# Patient Record
Sex: FEMALE | Race: WHITE | NOT HISPANIC OR LATINO | Employment: OTHER | URBAN - METROPOLITAN AREA
[De-identification: names, ages, dates, MRNs, and addresses within clinical notes are randomized per-mention and may not be internally consistent; named-entity substitution may affect disease eponyms.]

---

## 2021-06-14 ENCOUNTER — MEDICAL CORRESPONDENCE (OUTPATIENT)
Dept: HEALTH INFORMATION MANAGEMENT | Facility: CLINIC | Age: 31
End: 2021-06-14

## 2021-06-15 ENCOUNTER — TRANSCRIBE ORDERS (OUTPATIENT)
Dept: OTHER | Age: 31
End: 2021-06-15

## 2021-06-15 DIAGNOSIS — Z80.3 FAMILY HISTORY OF MALIGNANT NEOPLASM OF BREAST: Primary | ICD-10-CM

## 2021-06-27 ENCOUNTER — HEALTH MAINTENANCE LETTER (OUTPATIENT)
Age: 31
End: 2021-06-27

## 2021-07-28 ENCOUNTER — VIRTUAL VISIT (OUTPATIENT)
Dept: ONCOLOGY | Facility: CLINIC | Age: 31
End: 2021-07-28
Attending: OBSTETRICS & GYNECOLOGY
Payer: COMMERCIAL

## 2021-07-28 DIAGNOSIS — Z80.42 FAMILY HISTORY OF PROSTATE CANCER: ICD-10-CM

## 2021-07-28 DIAGNOSIS — Z80.3 FAMILY HISTORY OF MALIGNANT NEOPLASM OF BREAST: ICD-10-CM

## 2021-07-28 DIAGNOSIS — Z84.81 FAMILY HISTORY OF GENE MUTATION: Primary | ICD-10-CM

## 2021-07-28 DIAGNOSIS — Z80.0 FAMILY HISTORY OF COLON CANCER: ICD-10-CM

## 2021-07-28 PROCEDURE — 96040 HC GENETIC COUNSELING, EACH 30 MINUTES: CPT | Mod: GT | Performed by: GENETIC COUNSELOR, MS

## 2021-07-28 NOTE — LETTER
Cancer Risk Management  Program Locations    [facility]  [facility]  [facility]  Mailing Address  Cancer Risk Management Program  [address]  [place] [address]    New patient appointments  [phone]  July 29, 2021  Sujatha Astorga  [address] [place] [address]    Dear Sujatha,    It was a pleasure speaking with you over video for genetic counseling on 7/28/2021. Here is a copy of the progress note from our discussion. If you have any additional questions, please feel free to call.    Presenting Information:   I spoke with Sujatha Astorga over video today for genetic counseling to discuss her family history of cancer and a known BRCA2 mutation in her mother. With her permission, this appointment was conducted virtually due to COVID-19 precautions. We talked today to review this history, cancer screening recommendations, and available genetic testing options.    Personal History:  Sujatha is a 31 year old female. She does not have any personal history of cancer.    She had her first menstrual period at age 18 and is premenopausal. She does not have any children. Sujatha has her ovaries, fallopian tubes and uterus in place. She reports that she has not used hormone replacement therapy. She has used oral contraceptives and currently has an IUD. She has not yet had any breast imaging due to her age. She had a colonoscopy on 1/4/21 due to rectal bleeding, abdominal pain that detected no polyps. She reports one skin biopsy in 2012 that was benign. Sujatha reported no history of tobacco use and alcohol use of 6 drinks per week.    Family History: (Please see scanned pedigree for detailed family history information)  Siblings:    She has two sisters (ages 31 and 18) and one brother (age 25), all with no known  history of cancer. Her 31 year old sister has had genetic testing and was negative for the familial mutation. Her other sister is in the process of having genetic testing as well. She recently found a lump in her breast that is currently being evaluated.   Maternal:    Her mother is 55 years old and was recently diagnosed with breast cancer at age 55. She is currently having chemotherapy and is planning for a bilateral mastectomy. She also has a history of skin cancer in her late 40s-50. She recently underwent genetic testing and was found to have a mutation in the BRCA2 gene. Her testing was performed at [facility] in May 2021 via the Breast Cancer STAT panel plus EMILIO and CHEK2. She was positive for a mutation in the BRCA2 gene called c.2957dup (also known as p.[id number]).    Her aunt is 52 years old with no known history of cancer. She has a history of thyroid nodules at age 52. She has not yet had any genetic testing.     Her grandmother is 76 years old and was diagnosed with breast cancer at age 60. Treatment included chemotherapy. She has not yet had any genetic testing.     Her brother (Delmys great-uncle) had lung and brain cancer.     Her grandfather is 78 years old and has a history of skin cancer at age 50. He has not had any genetic testing.     He had two brothers who had prostate cancer, and a brother who had lung cancer.   Paternal:    Her father is 59 years old and has a history of skin cancer at age 55     Her uncle is 63 years old and has a history of colon cancer at an unknown age, possibly 10 years ago.    Her grandmother is in her 70s with no known history of cancer.     Her mother (Iqra great-grandmother) had either a gynecologic or breast cancer.     Her father had prostate cancer.     Her grandfather is in his 70s with no known history of cancer.     His mother had bone cancer.     Her maternal ethnicity is Citizen of [place]. Her paternal ethnicity is Citizen of [place]. There is no known Ashkenazi  Hoahaoism ancestry on either side of her family.     Discussion:    We reviewed the features of sporadic, familial, and hereditary cancers. There is a known BRCA2 mutation that has been detected in her mother. We reviewed that Sujatha has a 50% chance to have inherited this same mutation.     We discussed the natural history and genetics of hereditary cancer. We discussed the BRCA2 gene. Mutations in this gene cause a condition known as Hereditary Breast and Ovarian Cancer (HBOC) syndrome. Women with a mutation in this gene are at increased risk for breast and ovarian cancer. There is also an increased risk for a second primary breast cancer. Men with a mutation are at increased risk for breast and prostate cancer. Both women and men may also be at increased risk for pancreatic cancer and melanoma. A detailed handout regarding the BRCA2 gene and other genes in which mutations are associated with an increased risk for breast cancer will be provided to Sujatha via [facility] and can be found in the after visit summary. Topics included: inheritance pattern, cancer risks, cancer screening recommendations, and also risks, benefits and limitations of testing.    Based on her personal and family history, Sujatha meets current National Comprehensive Cancer Network (NCCN) criteria for genetic testing of high-penetrance breast and/or ovarian cancer susceptibility genes, which often includes BRCA1, BRCA2, CDH1, PALB2, PTEN, and TP53 among others.      We discussed that there are additional genes that could cause increased risk for breast, colon, prostate, and gynecologic cancer. As many of these genes present with overlapping features in a family and accurate cancer risk cannot always be established based upon the pedigree analysis alone, it would be reasonable for Sujatha to consider panel genetic testing to analyze multiple genes at once.    We reviewed genetic testing options for Sujatha based on her personal and family history:  testing for the familial BRCA2 mutation only, testing of a panel of genes associated with an increased risk for certain cancers, or larger panel options to include genes associated with increased risk for multiple different cancer types. Sujatha expressed an interest in more broad testing and opted for the [facility] Common Hereditary Cancers Panel.   Genetic testing is available for 47 genes associated with cancers of the breast, ovary, uterus, prostate and gastrointestinal system: [facility] Common Hereditary Cancers panel (APC, EMILIO, AXIN2, BARD1, BMPR1A, BRCA1, BRCA2, BRIP1, CDH1, CDK4, CDKN2A, CHEK2, CTNNA1, DICER1, EPCAM, GREM1, HOXB13, KIT, MEN1, MLH1, MSH2, MSH3, MSH6, MUTYH, NBN, NF1, NTHL1, PALB2, PDGFRA, PMS2, POLD1, POLE, PTEN,RAD50, RAD51C, RAD51D, SDHA, SDHB, SDHC, SDHD, SMAD4, SMARCA4, STK11, TP53,TSC1, TSC2, VHL).    We discussed that many of these genes are associated with specific hereditary cancer syndromes and published management guidelines: Hereditary Breast and Ovarian Cancer syndrome (BRCA1, BRCA2), Michael syndrome (MLH1, MSH2, MSH6, PMS2, EPCAM), Familial Adenomatous Polyposis (APC), Hereditary Diffuse Gastric Cancer (CDH1), Familial Atypical Multiple Mole Melanoma syndrome (CDK4, CDKN2A), Multiple Endocrine Neoplasia type 1 (MEN1), Juvenile Polyposis syndrome (BMPR1A, SMAD4), Cowden syndrome (PTEN), Li Fraumeni syndrome (TP53), Hereditary Paraganglioma and Pheochromocytoma syndrome (SDHA, SDHB, SDHC, SDHD), Peutz-Jeghers syndrome (STK11), MUTYH Associated Polyposis (MUTYH), Tuberous sclerosis complex (TSC1, TSC2), Von Hippel-Lindau disease (VHL), and Neurofibromatosis type 1 (NF1).   The EMILIO, AXIN2, BRIP1, CHEK2, GREM1, MSH3, NBN, NTHL1, PALB2, POLD1, POLE, RAD51C, and RAD51D genes are associated with increased cancer risk and have published management guidelines for certain cancers.   The remaining genes (BARD1, CTNNA1, DICER1, HOXB13, KIT, PDGFRA, RAD50, and SMARCA4) are associated with increased  cancer risk and may allow us to make medical recommendations when mutations are identified.     Due to COVID-19 precautions consent was obtained over the phone/video today. Genetic testing via the [facility] Common Hereditary Cancers Panel will be sent to [facility]. Sujatha opted to have a saliva sample collection kit shipped directly to her home from [facility]. She will ship the kit back to [facility] for analysis. Turnaround time from date when sample is received at the lab: approximately 3-4 weeks.    Medical Management: For Sujatha, we reviewed that the information from genetic testing may determine:    additional cancer screening for which Sujatha may qualify (i.e. mammogram and breast MRI, more frequent colonoscopies, more frequent dermatologic exams, etc.),    options for risk reducing surgeries Sujatha could consider (i.e. bilateral mastectomy, surgery to remove her ovaries and/or uterus, etc.),      and targeted chemotherapies if she were to develop certain cancers in the future (i.e. immunotherapy for individuals with Michael syndrome, PARP inhibitors, etc.).     These recommendations will be discussed in detail once genetic testing is completed.     Plan:  1) Today Sujatha elected to proceed with genetic testing via the [facility] Common Hereditary Cancers Panel offered by [facility].  2) This information should be available in 4-5 weeks.  3) Sujatha will be scheduled for a virtual visit (phone or video) to discuss the results.    Antionette Bledsoe MS, [facility]  Licensed, Certified Genetic Counselor  Office: [phone], Email: [email or web address]

## 2021-07-28 NOTE — PROGRESS NOTES
7/28/2021    Sujatha is a 31 year old who is being evaluated via a billable video visit.      Video-Visit Details    Type of service: Video Visit    Video Start Time: 09:05 am  Video End Time: 09:58 am    Originating Location (pt. Location): Home    Distant Location (provider location): Cancer Risk Management Program    Platform used for Video Visit: [employer]    Referring Provider: Angelia Flores MD    Presenting Information:   I spoke with Sujatha Astorga over video today for genetic counseling to discuss her family history of cancer and a known BRCA2 mutation in her mother. With her permission, this appointment was conducted virtually due to COVID-19 precautions. We talked today to review this history, cancer screening recommendations, and available genetic testing options.    Personal History:  Sujatha is a 31 year old female. She does not have any personal history of cancer.    She had her first menstrual period at age 18 and is premenopausal. She does not have any children. Sujatha has her ovaries, fallopian tubes and uterus in place. She reports that she has not used hormone replacement therapy. She has used oral contraceptives and currently has an IUD. She has not yet had any breast imaging due to her age. She had a colonoscopy on 1/4/21 due to rectal bleeding, abdominal pain that detected no polyps. She reports one skin biopsy in 2012 that was benign. Sujatha reported no history of tobacco use and alcohol use of 6 drinks per week.    Family History: (Please see scanned pedigree for detailed family history information)  Siblings:    She has two sisters (ages 31 and 18) and one brother (age 25), all with no known history of cancer. Her 31 year old sister has had genetic testing and was negative for the familial mutation. Her other sister is in the process of having genetic testing as well. She recently found a lump in her breast that is currently being evaluated.   Maternal:    Her mother is 55 years old and  was recently diagnosed with breast cancer at age 55. She is currently having chemotherapy and is planning for a bilateral mastectomy. She also has a history of skin cancer in her late 40s-50. She recently underwent genetic testing and was found to have a mutation in the BRCA2 gene. Her testing was performed at [facility] in May 2021 via the Breast Cancer STAT panel plus EMILIO and CHEK2. She was positive for a mutation in the BRCA2 gene called c.2957dup (also known as p.[id number]*2).    Her aunt is 52 years old with no known history of cancer. She has a history of thyroid nodules at age 52. She has not yet had any genetic testing.     Her grandmother is 76 years old and was diagnosed with breast cancer at age 60. Treatment included chemotherapy. She has not yet had any genetic testing.     Her brother (Sujatha's great-uncle) had lung and brain cancer.     Her grandfather is 78 years old and has a history of skin cancer at age 50. He has not had any genetic testing.     He had two brothers who had prostate cancer, and a brother who had lung cancer.   Paternal:    Her father is 59 years old and has a history of skin cancer at age 55     Her uncle is 63 years old and has a history of colon cancer at an unknown age, possibly 10 years ago.    Her grandmother is in her 70s with no known history of cancer.     Her mother (Sujatha's great-grandmother) had either a gynecologic or breast cancer.     Her father had prostate cancer.     Her grandfather is in his 70s with no known history of cancer.     His mother had bone cancer.     Her maternal ethnicity is [place]. Her paternal ethnicity is Syrian. There is no known Ashkenazi Mosque ancestry on either side of her family.     Discussion:    We reviewed the features of sporadic, familial, and hereditary cancers. There is a known BRCA2 mutation that has been detected in her mother. We reviewed that Sujatha has a 50% chance to have inherited this same mutation.     We  discussed the natural history and genetics of hereditary cancer. We discussed the BRCA2 gene. Mutations in this gene cause a condition known as Hereditary Breast and Ovarian Cancer (HBOC) syndrome. Women with a mutation in this gene are at increased risk for breast and ovarian cancer. There is also an increased risk for a second primary breast cancer. Men with a mutation are at increased risk for breast and prostate cancer. Both women and men may also be at increased risk for pancreatic cancer and melanoma. A detailed handout regarding the BRCA2 gene and other genes in which mutations are associated with an increased risk for breast cancer will be provided to Sujatha via [facility] and can be found in the after visit summary. Topics included: inheritance pattern, cancer risks, cancer screening recommendations, and also risks, benefits and limitations of testing.    Based on her personal and family history, Sujatha meets current National Comprehensive Cancer Network (NCCN) criteria for genetic testing of high-penetrance breast and/or ovarian cancer susceptibility genes, which often includes BRCA1, BRCA2, CDH1, PALB2, PTEN, and TP53 among others.      We discussed that there are additional genes that could cause increased risk for breast, colon, prostate, and gynecologic cancer. As many of these genes present with overlapping features in a family and accurate cancer risk cannot always be established based upon the pedigree analysis alone, it would be reasonable for Sujatha to consider panel genetic testing to analyze multiple genes at once.    We reviewed genetic testing options for Sujatha based on her personal and family history: testing for the familial BRCA2 mutation only, testing of a panel of genes associated with an increased risk for certain cancers, or larger panel options to include genes associated with increased risk for multiple different cancer types. Sujatha expressed an interest in more broad testing and  opted for the [facility] Common Hereditary Cancers Panel.   Genetic testing is available for 47 genes associated with cancers of the breast, ovary, uterus, prostate and gastrointestinal system: [facility] Common Hereditary Cancers panel (APC, EMILIO, AXIN2, BARD1, BMPR1A, BRCA1, BRCA2, BRIP1, CDH1, CDK4, CDKN2A, CHEK2, CTNNA1, DICER1, EPCAM, GREM1, HOXB13, KIT, MEN1, MLH1, MSH2, MSH3, MSH6, MUTYH, NBN, NF1, NTHL1, PALB2, PDGFRA, PMS2, POLD1, POLE, PTEN,RAD50, RAD51C, RAD51D, SDHA, SDHB, SDHC, SDHD, SMAD4, SMARCA4, STK11, TP53,TSC1, TSC2, VHL).    We discussed that many of these genes are associated with specific hereditary cancer syndromes and published management guidelines: Hereditary Breast and Ovarian Cancer syndrome (BRCA1, BRCA2), Michael syndrome (MLH1, MSH2, MSH6, PMS2, EPCAM), Familial Adenomatous Polyposis (APC), Hereditary Diffuse Gastric Cancer (CDH1), Familial Atypical Multiple Mole Melanoma syndrome (CDK4, CDKN2A), Multiple Endocrine Neoplasia type 1 (MEN1), Juvenile Polyposis syndrome (BMPR1A, SMAD4), Cowden syndrome (PTEN), Li Fraumeni syndrome (TP53), Hereditary Paraganglioma and Pheochromocytoma syndrome (SDHA, SDHB, SDHC, SDHD), Peutz-Jeghers syndrome (STK11), MUTYH Associated Polyposis (MUTYH), Tuberous sclerosis complex (TSC1, TSC2), Von Hippel-Lindau disease (VHL), and Neurofibromatosis type 1 (NF1).   The EMILIO, AXIN2, BRIP1, CHEK2, GREM1, MSH3, NBN, NTHL1, PALB2, POLD1, POLE, RAD51C, and RAD51D genes are associated with increased cancer risk and have published management guidelines for certain cancers.   The remaining genes (BARD1, CTNNA1, DICER1, HOXB13, KIT, PDGFRA, RAD50, and SMARCA4) are associated with increased cancer risk and may allow us to make medical recommendations when mutations are identified.     Due to COVID-19 precautions consent was obtained over the phone/video today. Genetic testing via the [facility] Common Hereditary Cancers Panel will be sent to [facility]. Sujatha  opted to have a saliva sample collection kit shipped directly to her home from [facility]. She will ship the kit back to [email or web address] for analysis. Turnaround time from date when sample is received at the lab: approximately 3-4 weeks.    Medical Management: For Sujatha, we reviewed that the information from genetic testing may determine:    additional cancer screening for which Sujatha may qualify (i.e. mammogram and breast MRI, more frequent colonoscopies, more frequent dermatologic exams, etc.),    options for risk reducing surgeries Sujatha could consider (i.e. bilateral mastectomy, surgery to remove her ovaries and/or uterus, etc.),      and targeted chemotherapies if she were to develop certain cancers in the future (i.e. immunotherapy for individuals with Michael syndrome, PARP inhibitors, etc.).     These recommendations will be discussed in detail once genetic testing is completed.     Plan:  1) Today Sujatha elected to proceed with genetic testing via the [facility] Common Hereditary Cancers Panel offered by [facility].  2) This information should be available in 4-5 weeks.  3) Sujatha will be scheduled for a virtual visit (phone or video) to discuss the results.    Time spent over video: 53 minutes    Antionette Bledsoe MS, [facility]  Licensed, Certified Genetic Counselor  Office: [phone]  Email: [email or web address]

## 2021-07-29 NOTE — PATIENT INSTRUCTIONS
Assessing Cancer Risk  Only about 5-10% of cancers are thought to be due to an inherited cancer susceptibility gene.    These families often have:    Several people with the same or related types of cancer    Cancers diagnosed at a young age (before age 50)    Individuals with more than one primary cancer    Multiple generations of the family affected with cancer    Some people may be candidates for genetic testing of more than one gene.  For these families, genetic testing using a cancer panel may be offered.  These panels will test different genes known to increase the risk for breast, ovarian, uterine, and/or other cancers. All of the genes discussed below have published clinical management guidelines for individuals who are found to carry a mutation. The purpose of this handout is to serve as a brief summary of the genes analyzed by the panels used to inquire about hereditary breast and gynecologic cancer:  EMILIO, BRCA1, BRCA2, BRIP1, CDH1, CHEK2, MLH1, MSH2, MSH6, PMS2, EPCAM, PTEN, PALB2, RAD51C, RAD51D, and TP53.  ______________________________________________________________________________  Hereditary Breast and Ovarian Cancer Syndrome   (BRCA1 and BRCA2)  A single mutation in one of the copies of BRCA1 or BRCA2 increases the risk for breast and ovarian cancer, among others.  The risk for pancreatic cancer and melanoma may also be slightly increased in some families.  The chart below shows the chance that someone with a BRCA mutation would develop cancer in his or her lifetime1,2,3,4.        A person s ethnic background is also important to consider, as individuals of Ashkenazi Rastafari ancestry have a higher chance of having a BRCA gene mutation.  There are three BRCA mutations that occur more frequently in this population.    Michael Syndrome   (MLH1, MSH2, MSH6, PMS2, and EPCAM)  Currently five genes are known to cause Michael Syndrome: MLH1, MSH2, MSH6, PMS2, and EPCAM.  A single mutation in one of the  Michael Syndrome genes increases the risk for colon, endometrial, ovarian, and stomach cancers.  Other cancers that occur less commonly in Michael Syndrome include urinary tract, skin, and brain cancers.  The chart below shows the chance that a person with Michael syndrome would develop cancer in his or her lifetime5.      *Cancer risk varies depending on Michael syndrome gene found    Cowden Syndrome   (PTEN)  Cowden syndrome is a hereditary condition that increases the risk for breast, thyroid, endometrial, colon, and kidney cancer.  Cowden syndrome is caused by a mutation in the PTEN gene.  A single mutation in one of the copies of PTEN causes Cowden syndrome and increases cancer risk.  The chart below shows the chance that someone with a PTEN mutation would develop cancer in their lifetime6,7.  Other benign features seen in some individuals with Cowden syndrome include benign skin lesions (facial papules, keratoses, lipomas), learning disability, autism, thyroid nodules, colon polyps, and larger head size.      *One recent study found breast cancer risk to be increased to 85%    Li-Fraumeni Syndrome   (TP53)  Li-Fraumeni Syndrome (LFS) is a cancer predisposition syndrome caused by a mutation in the TP53 gene. A single mutation in one of the copies of TP53 increases the risk for multiple cancers. Individuals with LFS are at an increased risk for developing cancer at a young age. The lifetime risk for development of a LFS-associated cancer is 50% by age 30 and 90% by age 60.   Core Cancers: Sarcomas, Breast, Brain, Lung, Leukemias/Lymphomas, Adrenocortical carcinomas  Other Cancers: Gastrointestinal, Thyroid, Skin, Genitourinary    Hereditary Diffuse Gastric Cancer   (CDH1)  Currently, one gene is known to cause hereditary diffuse gastric cancer (HDGC): CDH1.  Individuals with HDGC are at increased risk for diffuse gastric cancer and lobular breast cancer. Of people diagnosed with HDGC, 30-50% have a mutation in the CDH1  gene.  This suggests there are likely other genes that may cause HDGC that have not been identified yet.      Lifetime Cancer Risks    General Population HDGC    Diffuse Gastric  <1% ~80%   Breast 12% 39-52%         Additional Genes  EMILIO  EMILIO is a moderate-risk breast cancer gene. Women who have a mutation in EMILOI can have between a 2-4 fold increased risk for breast cancer compared to the general population8. EMILIO mutations have also been associated with increased risk for pancreatic cancer, however an estimate of this cancer risk is not well understood9. Individuals who inherit two EMILIO mutations have a condition called ataxia-telangiectasia (AT).  This rare autosomal recessive condition affects the nervous system and immune system, and is associated with progressive cerebellar ataxia beginning in childhood.  Individuals with ataxia-telangiectasia often have a weakened immune system and have an increased risk for childhood cancers.    PALB2  Mutations in PALB2 have been shown to increase the risk of breast cancer up to 33-58% in some families; where individuals fall within this risk range is dependent upon family [id number]. PALB2 mutations have also been associated with increased risk for pancreatic cancer, although this risk has not been quantified yet.  Individuals who inherit two PALB2 mutations--one from their mother and one from their father--have a condition called Fanconi Anemia.  This rare autosomal recessive condition is associated with short stature, developmental delay, bone marrow failure, and increased risk for childhood cancers.    CHEK2   CHEK2 is a moderate-risk breast cancer gene.  Women who have a mutation in CHEK2 have around a 2-fold increased risk for breast cancer compared to the general population, and this risk may be higher depending upon family history.11,12,13 Mutations in CHEK2 have also been shown to increase the risk of a number of other cancers, including colon and prostate, however  these cancer risks are currently not well understood.    BRIP1, RAD51C and RAD51D  Mutations in BRIP1, RAD51C, and RAD51D have been shown to increase the risk of ovarian cancer and possibly female breast cancer as well14,15 .       Lifetime Cancer Risk    General Population BRIP1 RAD51C RAD51D   Ovarian 1-2% ~5-8% ~5-9% ~7-15%           Inheritance  All of the cancer syndromes reviewed above are inherited in an autosomal dominant pattern.  This means that if a parent has a mutation, each of his or her children will have a 50% chance of inheriting that same mutation.  Therefore, each child--male or female--would have a 50% chance of being at increased risk for developing cancer.      Image obtained from Genetics Home Reference, 2013     Mutations in some genes can occur de asad, which means that a person s mutation occurred for the first time in them and was not inherited from a parent.  Now that they have the mutation, however, it can be passed on to future generations.    Genetic Testing  Genetic testing involves a blood test and will look at the genetic information in the EMILIO, BRCA1, BRCA2, BRIP1, CDH1, CHEK2, MLH1, MSH2, MSH6, PMS2, EPCAM, PTEN, PALB2, RAD51C, RAD51D, and TP53 genes for any harmful mutations that are associated with increased cancer risk.  If possible, it is recommended that the person(s) who has had cancer be tested before other family members.  That person will give us the most useful information about whether or not a specific gene is associated with the cancer in the family.    Results  There are three possible results of genetic testing:    Positive--a harmful mutation was identified in one or more of the genes    Negative--no mutation was identified in any of the genes on this panel    Variant of unknown significance--a variation in one of the genes was identified, but it is unclear how this impacts cancer risk in the family    Advantages and Disadvantages   There are advantages and  disadvantages to genetic testing.    Advantages    May clarify your cancer risk    Can help you make medical decisions    May explain the cancers in your family    May give useful information to your family members (if you share your results)    Disadvantages    Possible negative emotional impact of learning about inherited cancer risk    Uncertainty in interpreting a negative test result in some situations    Possible genetic discrimination concerns (see below)    Genetic Information Nondiscrimination Act (JUANITO)  JUANITO is a federal law that protects individuals from health insurance or employment discrimination based on a genetic test result alone.  Although rare, there are currently no legal discrimination protections in terms of life insurance, long term care, or disability insurances.  Visit the [employer] [place] website to learn more.    Reducing Cancer Risk  All of the genes described above have nationally recognized cancer screening guidelines that would be recommended for individuals who test positive.  In addition to increased cancer screening, surgeries may be offered or recommended to reduce cancer risk.  Recommendations are based upon an individual s genetic test result as well as their personal and family history of cancer.    Questions to Think About Regarding Genetic Testing:    What effect will the test result have on me and my relationship with my family members if I have an inherited gene mutation?  If I don t have a gene mutation?    Should I share my test results, and how will my family react to this news, which may also affect them?    Are my children ready to learn new information that may one day affect their own health?    Hereditary Cancer Resources    FORCE: Facing Our Risk of Cancer Empowered [email or web address]   Bright Pink [email or web address]   Li-Fraumeni Syndrome Association [email or web address]   PTEN World PTENworld.com   No stomach for cancer, Inc.  [email or web address]   Stomach cancer relief network Scrnet.org   Collaborative Group of the Americas on Inherited Colorectal Cancer (CGA) cgaicc.com    Cancer Care [email or web address]   American Cancer Society (ACS) cancer.org   National Cancer [place] (NCI) cancer.gov     Please call us if you have any questions or concerns.   Cancer Risk Management Program [phone] ([phone])  ? Miki Sanchez, MS, [facility] [phone]  ? Ayla Hickman, MS, [facility]  [phone]  ? Florencia Koch, MS, [facility]  [phone]  ? Olga Kellogg, MS, [facility] [phone]  ? Seema Mary, MS, [facility] [phone]  ? Antionette Bledsoe, MS, [facility]  [phone]    References  1. Erin A, Carl PDP, Julissa S, Korin MUSTAFA, Mic JE, Daphnie JL, Christo N, Alysa H, Dang O, Tom A, Jayson B, Tyler P, Manrichy S, Bridgette DM, Noguera N, Balta E, Rachel H, Kulwinder E, Tim J, Gronjack J, Adriana B, Citlaly H, Thorlacius S, Eerola H, Bernabe H, Venkatesh K, Becca OP. Average risks of breast and ovarian cancer associated with BRCA1 or BRCA2 mutations detected in case series unselected for family history: a combined analysis of 222 studies. Am J Hum Sheree. 2003;72:1117-30.  2. Fidelia ADAMS, Evie M, Bartolo G.  BRCA1 and BRCA2 Hereditary Breast and Ovarian Cancer. Gene Reviews online. 2013.  3. Tom YC, Edmund S, Hadley G, Greene S. Breast cancer risk among male BRCA1 and BRCA2 mutation carriers. J Natl Cancer Inst. 2007;99:1811-4.  4. Colby FARRELL, Marilu I, Shabbir J, Jonny E, Pa ER, Akil F. Risk of breast cancer in male BRCA2 carriers. J Med Sheree. 2010;47:710-1.  5. National Comprehensive Cancer Network. Clinical practice guidelines in oncology, colorectal cancer screening. Available online (registration required). 2015.  6. Cornelio CRUZ, Geovanna J, Melany J, Cristiane LA, Dwain JOLLY, Reema C. Lifetime cancer risks in individuals with germline PTEN mutations. Clin Cancer Res. 2012;18:400-7.  7. Brenda COCHRAN. Cowden Syndrome: A Critical Review of the  Clinical Literature. J Sheree . 2009:18:13-27.  8. Sonia A, Jacob D, Yanira S, Sonia P, Ovidio T, Malcolm M, Darell B, Sumi H, Tom R, Noel K, Marc L, Colby DG, Bridgette D, Kee DF, Ilya MR, The Breast Cancer Susceptibility Collaboration () & Katty ADAMS. EMILIO mutations that cause ataxia-telangiectasia are breast cancer susceptibility alleles. Nature Genetics. 2006;38:873-875  9. Juanjo N , Robert Y, Ana J, Marium L, Marcel GM , Shirley ML, Gallinger S, Richard AG, Syngal S, Suzanne ML, Deanna J , Meghann R, Uri SZ, Teresa JR, Claudio VE, Morteza M, Vokaren B, Diego N, Jessica RH, Tanya KW, and Zach AP. EMILIO mutations in patients with hereditary pancreatic cancer. Cancer Discover. 2012;2:41-46  10. Erin SHARMA, et al. Breast-Cancer Risk in Families with Mutations in PALB2. NEJM. 2014; 371(6):497-506.  11. CHEK2 Breast Cancer Case-Control Consortium. CHEK2*1100delC and susceptibility to breast cancer: A collaborative analysis involving 10,860 breast cancer cases and 9,065 controls from 10 studies. Am J Hum Sheree, 74 (2004), pp. 9583-4456  12. Mary Ellen T, Tresa S, Naveen K, et al. Spectrum of Mutations in BRCA1, BRCA2, CHEK2, and TP53 in Families at High Risk of Breast Cancer. ANTONIO. 2006;295(12):7478-4404.   13. Brent C, Tracy D, Rona A, et al. Risk of breast cancer in women with a CHEK2 mutation with and without a family history of breast cancer. J Clin Oncol. 2011;29:0971-9911.  14. Asaf H, Justin E, Tu SJ, et al. Contribution of germline mutations in the RAD51B, RAD51C, and RAD51D genes to ovarian cancer in the population. J Clin Oncol. 2015;33(26):1730-7741. Doi:10.1200/JCO.2015.61.2408.  15. Kosta T, Roxie GUAN, Sussy P, et al. Mutations in BRIP1 confer high risk of ovarian cancer. Lori Sheree. 2011;43(11):2152-9398. doi:10.1038/ng.955.

## 2021-09-02 ENCOUNTER — VIRTUAL VISIT (OUTPATIENT)
Dept: ONCOLOGY | Facility: CLINIC | Age: 31
End: 2021-09-02
Attending: GENETIC COUNSELOR, MS
Payer: COMMERCIAL

## 2021-09-02 DIAGNOSIS — Z84.81 FAMILY HISTORY OF GENE MUTATION: Primary | ICD-10-CM

## 2021-09-02 PROCEDURE — 999N000069 HC STATISTIC GENETIC COUNSELING, < 16 MIN: Mod: GT,95 | Performed by: GENETIC COUNSELOR, MS

## 2021-09-02 NOTE — LETTER
Cancer Risk Management  Program Locations    [facility]  [facility]  [facility]  Mailing Address  Cancer Risk Management Program  [address]  [place] [address]    New patient appointments  [phone]  October 15, 2021    Sujatha Astorga  [address] [address]      Dear Sujatha,    It was a pleasure speaking with you over video for genetic counseling on 9/2/2021. Here is a copy of the progress note from our discussion. If you have any additional questions, please feel free to call.    Referring Provider: Angelia Flores MD    Presenting Information:  I spoke to Sujatha over video to discuss her genetic testing results. Testing was performed on a saliva sample collected by Sujatha at her home. The [facility] Common Hereditary Cancers Panel was ordered from [employer]. This testing was done because of her family history of cancer and a known BRCA2 mutation in her mother.    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 This variant is called c.160G>T (also known as p.Ayz34Rzw). Given the uncertain significance of this result, medical management decisions should NOT be made based on this test result alone.    Of note, the familial BRCA2 mutation was not detected in Sujatha. Additionally, Sujatha tested negative for mutations or variants of uncertain significance in the following genes: APC, EMILIO, AXIN2, BARD1, BMPR1A, BRCA1, BRCA2, BRIP1, CDH1, CDK4, CDKN2A, CHEK2, CTNNA1, DICER1, EPCAM, GREM1, HOXB13, KIT, MEN1, MLH1, MSH3, MSH6, MUTYH, NBN, NF1, NTHL1, PALB2, PDGFRA, PMS2, POLD1, POLE, PTEN, RAD50, RAD51C, RAD51D, SDHA, SDHB, SDHC, SDHD, SMAD4, SMARCA4, STK11, TP53, TSC1, TSC2, VHL. We  reviewed the autosomal dominant inheritance of these genes. Sujatha cannot pass on a mutation in any of these genes to any future children based on this test result. Mutations in these genes do not skip generations.    Interpretation:  We discussed several different interpretations of this inconclusive test result. It is not clear if this variant in the MSH2 gene is associated with increased cancer risk.  1. This variant may be a benign change that does not increase cancer risk.  2. This variant may be a harmful mutation that causes an increased risk for cancer.    We reviewed that known pathogenic (harmful) mutations in the MSH2 gene cause Michael syndrome. Michael syndrome can be caused by a mutation in one of five genes: MLH1, MSH2, MSH6, PMS2, and EPCAM. The highest cancer risks associated with Michael syndrome include colon cancer, endometrial/uterine cancer, gastric cancer, and ovarian cancer. Other cancers have also been reported with Michael syndrome, such as urinary tract, pancreas, bile duct, and others.     In rare situations in which both parents have a mutation in the MSH2 gene, their children each have a 25% risk for constitutional mismatch repair deficiency syndrome (CMMRD). CMMRD is a disorder that causes cafe-au-lait spots and risk for childhood cancers. If this variant is later classified as harmful, Sujatha would be considered a carrier for CMMRD. In that case, genetic counseling and genetic testing may be advised for her partner.    The laboratory is working to determine if this variant is harmful or benign, and they will contact me if it is reclassified. If this variant is determined to be a benign change, there may be a different gene or combination of genes and environment that are associated with the cancers in this family.    It is also important to consider that her relatives may have a mutation in one of the genes tested and she did not inherit it. There is a known BRCA2 mutation that has been  previously identified in her mother.    Inheritance:  We reviewed the autosomal dominant inheritance of this variant in the MSH2 gene. We discussed that Sujatha has a 50% chance to pass this variant to any future children. Likewise, each of her siblings has a 50% chance of having the same variant. Because it is unclear what, if any, risk is associated with this variant, clinical genetic testing for this MSH2 variant alone is not recommended for relatives.    Screening:  Based on this inconclusive test result, it is important for Sujatha and her relatives to refer back to the family history for appropriate cancer screening.      We discussed her paternal uncle's history of colon cancer. Per current NCCN guidelines, individuals with a second or third-degree relative with colon cancer diagnosed at any age, should start colonoscopy at age 45-50 and this should be repeated every 10 years, or per colonoscopy findings. She should discuss this with her physicians in the future.    Other population cancer screening options, such as those recommended by the American Cancer Society and the National Comprehensive Cancer Network (NCCN), are also appropriate for Sujatha and her family. These screening recommendations may change if there are changes to Sujatha's personal and/or family history of cancer. Final screening recommendations should be made by each individual's primary care provider.    Additional Testing Considerations:  Although Sujatha's genetic testing result is inconclusive, other relatives may still carry a harmful gene mutation associated with an increased risk for cancer. Genetic counseling is recommended for her siblings and maternal relatives to discuss genetic testing options. If any of these relatives do pursue genetic testing, Sujatha is encouraged to contact me so that we may review the impact of their test results on her.    Summary:  While no genetic changes were identified, Sujatha may still be at risk  for certain cancers due to family history, environmental factors, or other genetic causes not identified by this test.  Because of that, it is important that she continue with cancer screening based on her personal and family history as discussed above.    Genetic testing is rapidly advancing, and new cancer susceptibility genes will most likely be identified in the future. Therefore, I encouraged Sujatha to contact me annually or if there are changes in her personal or family history. This may change how we assess her cancer risk, screening, and the testing we would offer.    Plan:  1.  Sujatha will be mailed a copy of her test results along with this letter.    2.  She plans to follow-up with her physicians.  3.  She should contact me annually, or sooner if her family history changes.  4.  I will contact Sujatha if the laboratory informs me that this VUS has been reclassified. This may change screening and testing recommendations for Sujatha and her relatives.    If Sujatha has any further questions, I encouraged her to contact me at [phone].    Antionette Bledsoe MS, [facility]  Licensed, Certified Genetic Counselor  Office: [phone]  Email: [email or web address]

## 2021-09-02 NOTE — Clinical Note
Please send copy of letter to patient with test results. Please enclose test results: Other Laboratory; [facility]; [facility] Common Hereditary Cancers panel, genetic testing (Laboratory Miscellaneous Order) [[id number]] (Order [id number])

## 2021-09-02 NOTE — PROGRESS NOTES
"9/2/2021    Sujatha is a 31 year old who is being evaluated via a billable video visit.      Video-Visit Details    Type of service: Video Visit    Video Start Time: 10:14 am  Video End Time: 10:19 am    Originating Location (pt. Location): Home    Distant Location (provider location): Cancer Risk Management Program    Platform used for Video Visit: [employer]    Referring Provider: Angelia Flores MD    Presenting Information:  I spoke to Sujatha over video to discuss her genetic testing results. Testing was performed on a saliva sample collected by Sujatha at her home. The [facility] Common Hereditary Cancers Panel was ordered from [employer]. This testing was done because of her family history of cancer and a known BRCA2 mutation in her mother.    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 This variant is called c.160G>T (also known as p.Nmb41Uan). Given the uncertain significance of this result, medical management decisions should NOT be made based on this test result alone.    Of note, the familial BRCA2 mutation was not detected in Sujatha. Additionally, Sujatha tested negative for mutations or variants of uncertain significance in the following genes: APC, EMILIO, AXIN2, BARD1, BMPR1A, BRCA1, BRCA2, BRIP1, CDH1, CDK4, CDKN2A, CHEK2, CTNNA1, DICER1, EPCAM, GREM1, HOXB13, KIT, MEN1, MLH1, MSH3, MSH6, MUTYH, NBN, NF1, NTHL1, PALB2, PDGFRA, PMS2, POLD1, POLE, PTEN, RAD50, RAD51C, RAD51D, SDHA, SDHB, SDHC, SDHD, SMAD4, SMARCA4, STK11, TP53, TSC1, TSC2, VHL. We reviewed the autosomal dominant inheritance of these genes. Sujatha cannot pass on a mutation in any of these genes to any future children based on this test result. Mutations in these genes do not skip generations.      A copy of the test report can be found in the Laboratory tab, dated 8/11/21, and named \"Laboratory Miscellaneous Order\". The report is " scanned in as a linked document.    Interpretation:  We discussed several different interpretations of this inconclusive test result. It is not clear if this variant in the MSH2 gene is associated with increased cancer risk.  1. This variant may be a benign change that does not increase cancer risk.  2. This variant may be a harmful mutation that causes an increased risk for cancer.    We reviewed that known pathogenic (harmful) mutations in the MSH2 gene cause Michael syndrome. Michael syndrome can be caused by a mutation in one of five genes: MLH1, MSH2, MSH6, PMS2, and EPCAM. The highest cancer risks associated with Michael syndrome include colon cancer, endometrial/uterine cancer, gastric cancer, and ovarian cancer. Other cancers have also been reported with Michael syndrome, such as urinary tract, pancreas, bile duct, and others.     In rare situations in which both parents have a mutation in the MSH2 gene, their children each have a 25% risk for constitutional mismatch repair deficiency syndrome (CMMRD). CMMRD is a disorder that causes cafe-au-lait spots and risk for childhood cancers. If this variant is later classified as harmful, Sujatha would be considered a carrier for CMMRD. In that case, genetic counseling and genetic testing may be advised for her partner.    The laboratory is working to determine if this variant is harmful or benign, and they will contact me if it is reclassified. If this variant is determined to be a benign change, there may be a different gene or combination of genes and environment that are associated with the cancers in this family.    It is also important to consider that her relatives may have a mutation in one of the genes tested and she did not inherit it. There is a known BRCA2 mutation that has been previously identified in her mother.    Inheritance:  We reviewed the autosomal dominant inheritance of this variant in the MSH2 gene. We discussed that Sujatha has a 50% chance to pass  this variant to any future children. Likewise, each of her siblings has a 50% chance of having the same variant. Because it is unclear what, if any, risk is associated with this variant, clinical genetic testing for this MSH2 variant alone is not recommended for relatives.    Screening:  Based on this inconclusive test result, it is important for Sujatha and her relatives to refer back to the family history for appropriate cancer screening.      We discussed her paternal uncle's history of colon cancer. Per current NCCN guidelines, individuals with a second or third-degree relative with colon cancer diagnosed at any age, should start colonoscopy at age 45-50 and this should be repeated every 10 years, or per colonoscopy findings. She should discuss this with her physicians in the future.    Other population cancer screening options, such as those recommended by the American Cancer Society and the National Comprehensive Cancer Network (NCCN), are also appropriate for Sujatha and her family. These screening recommendations may change if there are changes to Sujatha's personal and/or family history of cancer. Final screening recommendations should be made by each individual's primary care provider.    Additional Testing Considerations:  Although Sujatha's genetic testing result is inconclusive, other relatives may still carry a harmful gene mutation associated with an increased risk for cancer. Genetic counseling is recommended for her siblings and maternal relatives to discuss genetic testing options. If any of these relatives do pursue genetic testing, Sujatha is encouraged to contact me so that we may review the impact of their test results on her.    Summary:  While no genetic changes were identified, Sujatha may still be at risk for certain cancers due to family history, environmental factors, or other genetic causes not identified by this test.  Because of that, it is important that she continue with cancer  screening based on her personal and family history as discussed above.    Genetic testing is rapidly advancing, and new cancer susceptibility genes will most likely be identified in the future. Therefore, I encouraged Sujatha to contact me annually or if there are changes in her personal or family history. This may change how we assess her cancer risk, screening, and the testing we would offer.    Plan:  1.  Sujatha will be mailed a copy of her test results along with this letter.    2.  She plans to follow-up with her physicians.  3.  She should contact me annually, or sooner if her family history changes.  4.  I will contact Sujatha if the laboratory informs me that this VUS has been reclassified. This may change screening and testing recommendations for Sujatha and her relatives.    If Sujatha has any further questions, I encouraged her to contact me at [phone].    Time spent over video: 5 minutes    Antionette Bledsoe MS, [facility]  Licensed, Certified Genetic Counselor  Office: [phone]  Email: [email or web address]

## 2021-10-17 ENCOUNTER — HEALTH MAINTENANCE LETTER (OUTPATIENT)
Age: 31
End: 2021-10-17

## 2022-08-05 ENCOUNTER — LAB REQUISITION (OUTPATIENT)
Dept: LAB | Facility: CLINIC | Age: 32
End: 2022-08-05

## 2022-08-05 ENCOUNTER — LAB REQUISITION (OUTPATIENT)
Dept: LAB | Facility: CLINIC | Age: 32
End: 2022-08-05
Payer: COMMERCIAL

## 2022-08-05 DIAGNOSIS — O36.8990 MATERNAL CARE FOR OTHER SPECIFIED FETAL PROBLEMS, UNSPECIFIED TRIMESTER, NOT APPLICABLE OR UNSPECIFIED: ICD-10-CM

## 2022-08-05 LAB
ABO/RH(D): NORMAL
HCG INTACT+B SERPL-ACNC: ABNORMAL MIU/ML
SPECIMEN EXPIRATION DATE: NORMAL

## 2022-08-05 PROCEDURE — 86901 BLOOD TYPING SEROLOGIC RH(D): CPT | Mod: ORL | Performed by: OBSTETRICS & GYNECOLOGY

## 2022-08-05 PROCEDURE — 84702 CHORIONIC GONADOTROPIN TEST: CPT | Performed by: OBSTETRICS & GYNECOLOGY

## 2022-08-15 ENCOUNTER — LAB REQUISITION (OUTPATIENT)
Dept: LAB | Facility: CLINIC | Age: 32
End: 2022-08-15

## 2022-08-15 DIAGNOSIS — Z34.91 ENCOUNTER FOR SUPERVISION OF NORMAL PREGNANCY, UNSPECIFIED, FIRST TRIMESTER: ICD-10-CM

## 2022-08-15 LAB
ABO/RH(D): NORMAL
ANTIBODY SCREEN: NEGATIVE
BASOPHILS # BLD AUTO: 0 10E3/UL (ref 0–0.2)
BASOPHILS NFR BLD AUTO: 1 %
EOSINOPHIL # BLD AUTO: 0.1 10E3/UL (ref 0–0.7)
EOSINOPHIL NFR BLD AUTO: 1 %
ERYTHROCYTE [DISTWIDTH] IN BLOOD BY AUTOMATED COUNT: 12.3 % (ref 10–15)
HCT VFR BLD AUTO: 38.8 % (ref 35–47)
HGB BLD-MCNC: 13 G/DL (ref 11.7–15.7)
HOLD SPECIMEN: NORMAL
IMM GRANULOCYTES # BLD: 0 10E3/UL
IMM GRANULOCYTES NFR BLD: 0 %
LYMPHOCYTES # BLD AUTO: 1.9 10E3/UL (ref 0.8–5.3)
LYMPHOCYTES NFR BLD AUTO: 29 %
MCH RBC QN AUTO: 31.2 PG (ref 26.5–33)
MCHC RBC AUTO-ENTMCNC: 33.5 G/DL (ref 31.5–36.5)
MCV RBC AUTO: 93 FL (ref 78–100)
MONOCYTES # BLD AUTO: 0.4 10E3/UL (ref 0–1.3)
MONOCYTES NFR BLD AUTO: 7 %
NEUTROPHILS # BLD AUTO: 4.1 10E3/UL (ref 1.6–8.3)
NEUTROPHILS NFR BLD AUTO: 62 %
NRBC # BLD AUTO: 0 10E3/UL
NRBC BLD AUTO-RTO: 0 /100
PLATELET # BLD AUTO: 191 10E3/UL (ref 150–450)
RBC # BLD AUTO: 4.17 10E6/UL (ref 3.8–5.2)
SPECIMEN EXPIRATION DATE: NORMAL
WBC # BLD AUTO: 6.5 10E3/UL (ref 4–11)

## 2022-08-15 PROCEDURE — 85025 COMPLETE CBC W/AUTO DIFF WBC: CPT | Performed by: OBSTETRICS & GYNECOLOGY

## 2022-08-15 PROCEDURE — 86592 SYPHILIS TEST NON-TREP QUAL: CPT | Performed by: OBSTETRICS & GYNECOLOGY

## 2022-08-15 PROCEDURE — 87389 HIV-1 AG W/HIV-1&-2 AB AG IA: CPT | Performed by: OBSTETRICS & GYNECOLOGY

## 2022-08-15 PROCEDURE — 86901 BLOOD TYPING SEROLOGIC RH(D): CPT | Performed by: OBSTETRICS & GYNECOLOGY

## 2022-08-15 PROCEDURE — 86762 RUBELLA ANTIBODY: CPT | Performed by: OBSTETRICS & GYNECOLOGY

## 2022-08-15 PROCEDURE — 86803 HEPATITIS C AB TEST: CPT | Performed by: OBSTETRICS & GYNECOLOGY

## 2022-08-15 PROCEDURE — 87340 HEPATITIS B SURFACE AG IA: CPT | Performed by: OBSTETRICS & GYNECOLOGY

## 2022-08-16 LAB
HBV SURFACE AG SERPL QL IA: NONREACTIVE
HCV AB SERPL QL IA: NONREACTIVE
HIV 1+2 AB+HIV1 P24 AG SERPL QL IA: NONREACTIVE
RPR SER QL: NONREACTIVE
RUBV IGG SERPL QL IA: 6.22 INDEX
RUBV IGG SERPL QL IA: POSITIVE

## 2022-10-02 ENCOUNTER — HEALTH MAINTENANCE LETTER (OUTPATIENT)
Age: 32
End: 2022-10-02

## 2022-10-13 ENCOUNTER — MEDICAL CORRESPONDENCE (OUTPATIENT)
Dept: HEALTH INFORMATION MANAGEMENT | Facility: CLINIC | Age: 32
End: 2022-10-13

## 2022-10-13 DIAGNOSIS — Z3A.17 17 WEEKS GESTATION OF PREGNANCY: ICD-10-CM

## 2022-10-13 DIAGNOSIS — R00.2 PALPITATIONS: Primary | ICD-10-CM

## 2022-10-14 ENCOUNTER — LAB (OUTPATIENT)
Dept: LAB | Facility: CLINIC | Age: 32
End: 2022-10-14
Payer: COMMERCIAL

## 2022-10-14 ENCOUNTER — OFFICE VISIT (OUTPATIENT)
Dept: CARDIOLOGY | Facility: CLINIC | Age: 32
End: 2022-10-14
Attending: MIDWIFE
Payer: COMMERCIAL

## 2022-10-14 VITALS
DIASTOLIC BLOOD PRESSURE: 70 MMHG | HEART RATE: 85 BPM | BODY MASS INDEX: 25.23 KG/M2 | OXYGEN SATURATION: 97 % | HEIGHT: 66 IN | SYSTOLIC BLOOD PRESSURE: 102 MMHG | WEIGHT: 157 LBS

## 2022-10-14 DIAGNOSIS — R94.31 ECG ABNORMALITY: Primary | ICD-10-CM

## 2022-10-14 DIAGNOSIS — Z3A.17 17 WEEKS GESTATION OF PREGNANCY: ICD-10-CM

## 2022-10-14 DIAGNOSIS — R00.2 PALPITATIONS: ICD-10-CM

## 2022-10-14 LAB
MAGNESIUM SERPL-MCNC: 1.9 MG/DL (ref 1.6–2.3)
TSH SERPL DL<=0.005 MIU/L-ACNC: 1.92 MU/L (ref 0.4–4)

## 2022-10-14 PROCEDURE — 83735 ASSAY OF MAGNESIUM: CPT | Performed by: INTERNAL MEDICINE

## 2022-10-14 PROCEDURE — 36415 COLL VENOUS BLD VENIPUNCTURE: CPT | Performed by: INTERNAL MEDICINE

## 2022-10-14 PROCEDURE — 93000 ELECTROCARDIOGRAM COMPLETE: CPT | Performed by: INTERNAL MEDICINE

## 2022-10-14 PROCEDURE — 84443 ASSAY THYROID STIM HORMONE: CPT | Performed by: INTERNAL MEDICINE

## 2022-10-14 PROCEDURE — 99204 OFFICE O/P NEW MOD 45 MIN: CPT | Performed by: INTERNAL MEDICINE

## 2022-10-14 RX ORDER — CHOLECALCIFEROL (VITAMIN D3) 125 MCG
CAPSULE ORAL DAILY
COMMUNITY

## 2022-10-14 RX ORDER — FLUTICASONE PROPIONATE 50 MCG
SPRAY, SUSPENSION (ML) NASAL DAILY PRN
COMMUNITY
Start: 2022-06-14

## 2022-10-14 NOTE — PROGRESS NOTES
CARDIOLOGY CLINIC CONSULTATION      REASON FOR CONSULT:   Palpitations    PRIMARY CARE PHYSICIAN:  Carmelita Jacques        History of Present Illness   Sujatha Vasquez is an extremely pleasant 32 year old female here as a new patient to discuss her palpitations.  She has no chronic past medical history.  She is currently 17 weeks pregnant.  This is her first pregnancy.  Her family history is significant for her father recently being diagnosed with atrial fibrillation.  She is a never smoker.  She does not drink alcohol currently.  She exercises regularly with no significant exertional symptoms.    Her palpitations typically occur at night, most often when she is lying on either her left or right side rather than her back.  These have been more prominent until about 1-2 weeks ago, and she has not had them significantly since.  It typically lasts between 30-60 minutes, or however long it takes her to ultimately fall asleep.  By the time that she wakes up in the morning they are usually gone.  Sometimes she can feel a difficulty taking a deep breath when these are going on, but no syncope/presyncope, chest pain, etc.    Her most recent labs are from 8/15/2022, which are a CBC that was normal.  I do not see any chemistry panel or thyroid testing.  Her EKG shows normal sinus rhythm and what I suspect is transposition of the right and left arm leads, though theoretically significant cardiac rotation is possible.  She has not had an echocardiogram or other cardiac testing in our system that I can see.      Assessment & Plan     1. Palpitations, uncertain etiology, suspect benign by history  2. Abnormal ECG, likely due to right/left arm limb lead reversal  3. Current pregnancy (G1, P0), 17 weeks  4. Family history of atrial fibrillation in father      It was a pleasure to meet with Sujatha in clinic today.  We discussed the potential causes of her palpitations, as well as the appropriate work-up and management.   Thankfully, these do appear benign by her description, but I explained that the only way we can know for sure is to actually capture an episode.  We will start with a 14-day Zio patch to attempt to capture this.  In addition, we should check a transthoracic echocardiogram to rule out any significant structural abnormalities, and basic lab work to look for any electrolyte derangements, anemia, or hyperthyroidism which may be contributing.  In regards to her ECG, I think that this is almost certainly right/left arm limb lead reversal, so we will repeat this ECG for clarity.      -Repeat ECG to evaluate for right/left arm limb lead reversal  -14-day Zio patch  -TTE  -Labs: BMP, magnesium, CBC, TSH  -Further plans pending results of above testing        Follow-up: If above testing is unremarkable, no routine cardiac follow-up is required.  However, if significant abnormalities are seen, or any other questions/concerns arise, we are happy to see Sujatha back at any time.          Oren Rizo MD  Interventional Cardiology  October 14, 2022        Medications   Current Outpatient Medications   Medication     Cholecalciferol (VITAMIN D) 125 MCG (5000 UT) capsule     fluticasone (FLONASE) 50 MCG/ACT nasal spray     Prenatal Multivit-Min-Fe-FA (PRENATAL 1 + IRON OR)     No current facility-administered medications for this visit.     Allergies   No Known Allergies      Physical Exam       BP: 102/70 Pulse: 85     SpO2: 97 %      Vital Signs with Ranges  Pulse:  [85] 85  BP: (102)/(70) 102/70  SpO2:  [97 %] 97 %  157 lbs 0 oz    Constitutional: Well-appearing, no acute distress  Respiratory: Normal respiratory effort, CTAB  Cardiovascular: RRR, 1/6 systolic ejection murmur at the RUSB.  JVP < 7 cm H2O.  There is no LE edema.  Normal carotid upstrokes, no carotid bruits.

## 2022-10-14 NOTE — LETTER
10/14/2022    Carmelita Jacques, [facility]  [facility] [address]  [facility] [address]    RE: Sujatha Montielstephanienathanael       Dear Colleague,     I had the pleasure of seeing Sujatha Vasquez in the [facility].  CARDIOLOGY CLINIC CONSULTATION      REASON FOR CONSULT:   Palpitations    PRIMARY CARE PHYSICIAN:  Carmelita Jacques        History of Present Illness   Sujatha Vasquez is an extremely pleasant 32 year old female here as a new patient to discuss her palpitations.  She has no chronic past medical history.  She is currently 17 weeks pregnant.  This is her first pregnancy.  Her family history is significant for her father recently being diagnosed with atrial fibrillation.  She is a never smoker.  She does not drink alcohol currently.  She exercises regularly with no significant exertional symptoms.    Her palpitations typically occur at night, most often when she is lying on either her left or right side rather than her back.  These have been more prominent until about 1-2 weeks ago, and she has not had them significantly since.  It typically lasts between 30-60 minutes, or however long it takes her to ultimately fall asleep.  By the time that she wakes up in the morning they are usually gone.  Sometimes she can feel a difficulty taking a deep breath when these are going on, but no syncope/presyncope, chest pain, etc.    Her most recent labs are from 8/15/2022, which are a CBC that was normal.  I do not see any chemistry panel or thyroid testing.  Her EKG shows normal sinus rhythm and what I suspect is transposition of the right and left arm leads, though theoretically significant cardiac rotation is possible.  She has not had an echocardiogram or other cardiac testing in our system that I can see.      Assessment & Plan     1. Palpitations, uncertain etiology, suspect benign by history  2. Abnormal ECG, likely due to right/left arm limb lead reversal  3. Current pregnancy (G1, P0), 17  weeks  4. Family history of atrial fibrillation in father      It was a pleasure to meet with Sujatha in clinic today.  We discussed the potential causes of her palpitations, as well as the appropriate work-up and management.  Thankfully, these do appear benign by her description, but I explained that the only way we can know for sure is to actually capture an episode.  We will start with a 14-day Zio patch to attempt to capture this.  In addition, we should check a transthoracic echocardiogram to rule out any significant structural abnormalities, and basic lab work to look for any electrolyte derangements, anemia, or hyperthyroidism which may be contributing.  In regards to her ECG, I think that this is almost certainly right/left arm limb lead reversal, so we will repeat this ECG for clarity.      -Repeat ECG to evaluate for right/left arm limb lead reversal  -14-day Zio patch  -TTE  -Labs: BMP, magnesium, CBC, TSH  -Further plans pending results of above testing        Follow-up: If above testing is unremarkable, no routine cardiac follow-up is required.  However, if significant abnormalities are seen, or any other questions/concerns arise, we are happy to see Sujatha back at any time.          Oren Rizo MD  Interventional Cardiology  October 14, 2022        Medications   Current Outpatient Medications   Medication     Cholecalciferol (VITAMIN D) 125 MCG (5000 UT) capsule     fluticasone (FLONASE) 50 MCG/ACT nasal spray     Prenatal Multivit-Min-Fe-FA (PRENATAL 1 + IRON OR)     No current facility-administered medications for this visit.     Allergies   No Known Allergies      Physical Exam       BP: 102/70 Pulse: 85     SpO2: 97 %      Vital Signs with Ranges  Pulse:  [85] 85  BP: (102)/(70) 102/70  SpO2:  [97 %] 97 %  157 lbs 0 oz    Constitutional: Well-appearing, no acute distress  Respiratory: Normal respiratory effort, CTAB  Cardiovascular: RRR, 1/6 systolic ejection murmur at the RUSB.  JVP < 7 cm  H2O.  There is no LE edema.  Normal carotid upstrokes, no carotid bruits.      Thank you for allowing me to participate in the care of your patient.      Sincerely,     Oren Rizo MD     [facility]  cc:   CAS Mejia  [address] [place] [id number]

## 2022-10-31 ENCOUNTER — TRANSFERRED RECORDS (OUTPATIENT)
Dept: HEALTH INFORMATION MANAGEMENT | Facility: CLINIC | Age: 32
End: 2022-10-31

## 2022-11-01 ENCOUNTER — LAB (OUTPATIENT)
Dept: LAB | Facility: CLINIC | Age: 32
End: 2022-11-01
Payer: COMMERCIAL

## 2022-11-01 ENCOUNTER — HOSPITAL ENCOUNTER (OUTPATIENT)
Dept: CARDIOLOGY | Facility: CLINIC | Age: 32
Discharge: HOME | End: 2022-11-01
Attending: INTERNAL MEDICINE | Admitting: INTERNAL MEDICINE
Payer: COMMERCIAL

## 2022-11-01 DIAGNOSIS — R00.2 PALPITATIONS: ICD-10-CM

## 2022-11-01 LAB
ANION GAP SERPL CALCULATED.3IONS-SCNC: 3 MMOL/L (ref 3–14)
BUN SERPL-MCNC: 7 MG/DL (ref 7–30)
CALCIUM SERPL-MCNC: 8.9 MG/DL (ref 8.5–10.1)
CHLORIDE BLD-SCNC: 103 MMOL/L (ref 94–109)
CO2 SERPL-SCNC: 28 MMOL/L (ref 20–32)
CREAT SERPL-MCNC: 0.52 MG/DL (ref 0.52–1.04)
ERYTHROCYTE [DISTWIDTH] IN BLOOD BY AUTOMATED COUNT: 13.1 % (ref 10–15)
GFR SERPL CREATININE-BSD FRML MDRD: >90 ML/MIN/1.73M2
GLUCOSE BLD-MCNC: 92 MG/DL (ref 70–99)
HCT VFR BLD AUTO: 37.9 % (ref 35–47)
HGB BLD-MCNC: 12.7 G/DL (ref 11.7–15.7)
MCH RBC QN AUTO: 32.3 PG (ref 26.5–33)
MCHC RBC AUTO-ENTMCNC: 33.5 G/DL (ref 31.5–36.5)
MCV RBC AUTO: 96 FL (ref 78–100)
PLATELET # BLD AUTO: 148 10E3/UL (ref 150–450)
POTASSIUM BLD-SCNC: 3.7 MMOL/L (ref 3.4–5.3)
RBC # BLD AUTO: 3.93 10E6/UL (ref 3.8–5.2)
SODIUM SERPL-SCNC: 134 MMOL/L (ref 133–144)
WBC # BLD AUTO: 8.4 10E3/UL (ref 4–11)

## 2022-11-01 PROCEDURE — 80048 BASIC METABOLIC PNL TOTAL CA: CPT

## 2022-11-01 PROCEDURE — 93246 EXT ECG>7D<15D RECORDING: CPT

## 2022-11-01 PROCEDURE — 93000 ELECTROCARDIOGRAM COMPLETE: CPT

## 2022-11-01 PROCEDURE — 85027 COMPLETE CBC AUTOMATED: CPT

## 2022-11-01 PROCEDURE — 93248 EXT ECG>7D<15D REV&INTERPJ: CPT | Performed by: INTERNAL MEDICINE

## 2022-11-02 ENCOUNTER — TRANSCRIBE ORDERS (OUTPATIENT)
Dept: MATERNAL FETAL MEDICINE | Facility: CLINIC | Age: 32
End: 2022-11-02

## 2022-11-02 DIAGNOSIS — O26.90 PREGNANCY RELATED CONDITION, ANTEPARTUM: Primary | ICD-10-CM

## 2022-11-03 ENCOUNTER — PRE VISIT (OUTPATIENT)
Dept: MATERNAL FETAL MEDICINE | Facility: HOSPITAL | Age: 32
End: 2022-11-03

## 2022-11-09 ENCOUNTER — OFFICE VISIT (OUTPATIENT)
Dept: MATERNAL FETAL MEDICINE | Facility: HOSPITAL | Age: 32
End: 2022-11-09
Attending: NURSE PRACTITIONER
Payer: COMMERCIAL

## 2022-11-09 ENCOUNTER — ANCILLARY PROCEDURE (OUTPATIENT)
Dept: ULTRASOUND IMAGING | Facility: HOSPITAL | Age: 32
End: 2022-11-09
Attending: NURSE PRACTITIONER
Payer: COMMERCIAL

## 2022-11-09 DIAGNOSIS — Z03.73 SUSPECTED FETAL ANOMALY NOT FOUND: Primary | ICD-10-CM

## 2022-11-09 DIAGNOSIS — Z84.89 FAMILY HISTORY OF GENETIC DISORDER: ICD-10-CM

## 2022-11-09 DIAGNOSIS — O26.90 PREGNANCY RELATED CONDITION, ANTEPARTUM: ICD-10-CM

## 2022-11-09 PROCEDURE — 76811 OB US DETAILED SNGL FETUS: CPT

## 2022-11-09 PROCEDURE — 76811 OB US DETAILED SNGL FETUS: CPT | Mod: 26 | Performed by: OBSTETRICS & GYNECOLOGY

## 2022-11-09 PROCEDURE — 99202 OFFICE O/P NEW SF 15 MIN: CPT | Mod: 25 | Performed by: OBSTETRICS & GYNECOLOGY

## 2022-11-09 NOTE — PROGRESS NOTES
Thank-you for referring your patient for a comprehensive ultrasound.  She has declined serum aneuploidy screening in this pregnancy. I reviewed the report from her prior ultrasound and her obstetric history. We also discussed her history of a maternal aunt with Prader Willi. we reviewed that this is considered a sporadic genetic condition and her fetus is not expected to be at an increased risk for this compared to the general population. We reviewed her normal ultrasound findings today. No further ultrasound surveillance is recommended unless clinically indicated.    Return to primary provider for continued prenatal care.    If you have questions regarding today's evaluation or if we can be of further service, please contact the [facility].    **Fetal anomalies may be present but not detected**

## 2022-11-28 ENCOUNTER — LAB REQUISITION (OUTPATIENT)
Dept: LAB | Facility: CLINIC | Age: 32
End: 2022-11-28

## 2022-11-28 DIAGNOSIS — Z86.2 PERSONAL HISTORY OF DISEASES OF THE BLOOD AND BLOOD-FORMING ORGANS AND CERTAIN DISORDERS INVOLVING THE IMMUNE MECHANISM: ICD-10-CM

## 2022-11-28 LAB
ERYTHROCYTE [DISTWIDTH] IN BLOOD BY AUTOMATED COUNT: 13 % (ref 10–15)
HCT VFR BLD AUTO: 35.8 % (ref 35–47)
HGB BLD-MCNC: 12.1 G/DL (ref 11.7–15.7)
MCH RBC QN AUTO: 32.3 PG (ref 26.5–33)
MCHC RBC AUTO-ENTMCNC: 33.8 G/DL (ref 31.5–36.5)
MCV RBC AUTO: 96 FL (ref 78–100)
PLATELET # BLD AUTO: 125 10E3/UL (ref 150–450)
RBC # BLD AUTO: 3.75 10E6/UL (ref 3.8–5.2)
WBC # BLD AUTO: 10.3 10E3/UL (ref 4–11)

## 2022-11-28 PROCEDURE — 85014 HEMATOCRIT: CPT | Performed by: OBSTETRICS & GYNECOLOGY

## 2022-12-29 ENCOUNTER — LAB REQUISITION (OUTPATIENT)
Dept: LAB | Facility: CLINIC | Age: 32
End: 2022-12-29

## 2022-12-29 DIAGNOSIS — Z36.89 ENCOUNTER FOR OTHER SPECIFIED ANTENATAL SCREENING: ICD-10-CM

## 2022-12-29 LAB
BASOPHILS # BLD MANUAL: 0 10E3/UL (ref 0–0.2)
BASOPHILS NFR BLD MANUAL: 0 %
EOSINOPHIL # BLD MANUAL: 0.1 10E3/UL (ref 0–0.7)
EOSINOPHIL NFR BLD MANUAL: 1 %
ERYTHROCYTE [DISTWIDTH] IN BLOOD BY AUTOMATED COUNT: 12.7 % (ref 10–15)
GLUCOSE 1H P 50 G GLC PO SERPL-MCNC: 118 MG/DL (ref 70–129)
HCT VFR BLD AUTO: 35.4 % (ref 35–47)
HGB BLD-MCNC: 11.9 G/DL (ref 11.7–15.7)
HOLD SPECIMEN: NORMAL
LYMPHOCYTES # BLD MANUAL: 0.8 10E3/UL (ref 0.8–5.3)
LYMPHOCYTES NFR BLD MANUAL: 8 %
MCH RBC QN AUTO: 31.6 PG (ref 26.5–33)
MCHC RBC AUTO-ENTMCNC: 33.6 G/DL (ref 31.5–36.5)
MCV RBC AUTO: 94 FL (ref 78–100)
MONOCYTES # BLD MANUAL: 0.2 10E3/UL (ref 0–1.3)
MONOCYTES NFR BLD MANUAL: 2 %
NEUTROPHILS # BLD MANUAL: 8.5 10E3/UL (ref 1.6–8.3)
NEUTROPHILS NFR BLD MANUAL: 89 %
PLAT MORPH BLD: ABNORMAL
PLATELET # BLD AUTO: 115 10E3/UL (ref 150–450)
RBC # BLD AUTO: 3.76 10E6/UL (ref 3.8–5.2)
RBC MORPH BLD: ABNORMAL
T PALLIDUM AB SER QL: NONREACTIVE
WBC # BLD AUTO: 9.5 10E3/UL (ref 4–11)

## 2022-12-29 PROCEDURE — 85027 COMPLETE CBC AUTOMATED: CPT | Performed by: OBSTETRICS & GYNECOLOGY

## 2022-12-29 PROCEDURE — 82950 GLUCOSE TEST: CPT | Performed by: OBSTETRICS & GYNECOLOGY

## 2022-12-29 PROCEDURE — 85007 BL SMEAR W/DIFF WBC COUNT: CPT | Performed by: OBSTETRICS & GYNECOLOGY

## 2022-12-29 PROCEDURE — 86780 TREPONEMA PALLIDUM: CPT | Performed by: OBSTETRICS & GYNECOLOGY

## 2023-01-09 ENCOUNTER — LAB REQUISITION (OUTPATIENT)
Dept: LAB | Facility: CLINIC | Age: 33
End: 2023-01-09

## 2023-01-09 DIAGNOSIS — O26.849 UTERINE SIZE-DATE DISCREPANCY, UNSPECIFIED TRIMESTER: ICD-10-CM

## 2023-01-09 LAB
FASTING STATUS PATIENT QL REPORTED: NO
GLUCOSE SERPL-MCNC: 84 MG/DL (ref 70–99)

## 2023-01-09 PROCEDURE — 82947 ASSAY GLUCOSE BLOOD QUANT: CPT | Performed by: OBSTETRICS & GYNECOLOGY

## 2023-01-23 ENCOUNTER — LAB REQUISITION (OUTPATIENT)
Dept: LAB | Facility: CLINIC | Age: 33
End: 2023-01-23
Payer: COMMERCIAL

## 2023-01-23 DIAGNOSIS — R53.83 OTHER FATIGUE: ICD-10-CM

## 2023-01-23 LAB
ERYTHROCYTE [DISTWIDTH] IN BLOOD BY AUTOMATED COUNT: 12.7 % (ref 10–15)
HCT VFR BLD AUTO: 38.5 % (ref 35–47)
HGB BLD-MCNC: 12.5 G/DL (ref 11.7–15.7)
MCH RBC QN AUTO: 31.5 PG (ref 26.5–33)
MCHC RBC AUTO-ENTMCNC: 32.5 G/DL (ref 31.5–36.5)
MCV RBC AUTO: 97 FL (ref 78–100)
PLATELET # BLD AUTO: 106 10E3/UL (ref 150–450)
RBC # BLD AUTO: 3.97 10E6/UL (ref 3.8–5.2)
WBC # BLD AUTO: 12.6 10E3/UL (ref 4–11)

## 2023-01-23 PROCEDURE — 85027 COMPLETE CBC AUTOMATED: CPT | Mod: ORL | Performed by: MIDWIFE

## 2023-02-09 ENCOUNTER — LAB REQUISITION (OUTPATIENT)
Dept: LAB | Facility: CLINIC | Age: 33
End: 2023-02-09
Payer: COMMERCIAL

## 2023-02-09 DIAGNOSIS — D69.59 OTHER SECONDARY THROMBOCYTOPENIA: ICD-10-CM

## 2023-02-09 LAB
ERYTHROCYTE [DISTWIDTH] IN BLOOD BY AUTOMATED COUNT: 13.4 % (ref 10–15)
HCT VFR BLD AUTO: 40.3 % (ref 35–47)
HGB BLD-MCNC: 13.1 G/DL (ref 11.7–15.7)
MCH RBC QN AUTO: 31.7 PG (ref 26.5–33)
MCHC RBC AUTO-ENTMCNC: 32.5 G/DL (ref 31.5–36.5)
MCV RBC AUTO: 98 FL (ref 78–100)
PLATELET # BLD AUTO: 135 10E3/UL (ref 150–450)
RBC # BLD AUTO: 4.13 10E6/UL (ref 3.8–5.2)
WBC # BLD AUTO: 11.4 10E3/UL (ref 4–11)

## 2023-02-09 PROCEDURE — 85027 COMPLETE CBC AUTOMATED: CPT | Mod: ORL | Performed by: MIDWIFE

## 2023-02-16 ENCOUNTER — HOSPITAL ENCOUNTER (INPATIENT)
Facility: CLINIC | Age: 33
LOS: 2 days | Discharge: HOME | End: 2023-02-18
Attending: OBSTETRICS & GYNECOLOGY | Admitting: OBSTETRICS & GYNECOLOGY
Payer: COMMERCIAL

## 2023-02-16 ENCOUNTER — ANESTHESIA EVENT (OUTPATIENT)
Dept: OBGYN | Facility: CLINIC | Age: 33
End: 2023-02-16
Payer: COMMERCIAL

## 2023-02-16 ENCOUNTER — ANESTHESIA (OUTPATIENT)
Dept: OBGYN | Facility: CLINIC | Age: 33
End: 2023-02-16
Payer: COMMERCIAL

## 2023-02-16 PROBLEM — O60.00 PRETERM LABOR: Status: ACTIVE | Noted: 2023-02-16

## 2023-02-16 LAB
ABO/RH(D): NORMAL
ALBUMIN UR-MCNC: NEGATIVE MG/DL
ANTIBODY SCREEN: NEGATIVE
APPEARANCE UR: CLEAR
BASOPHILS # BLD AUTO: 0.1 10E3/UL (ref 0–0.2)
BASOPHILS NFR BLD AUTO: 0 %
BILIRUB UR QL STRIP: NEGATIVE
COLOR UR AUTO: NORMAL
EOSINOPHIL # BLD AUTO: 0.1 10E3/UL (ref 0–0.7)
EOSINOPHIL NFR BLD AUTO: 1 %
ERYTHROCYTE [DISTWIDTH] IN BLOOD BY AUTOMATED COUNT: 12.8 % (ref 10–15)
ERYTHROCYTE [DISTWIDTH] IN BLOOD BY AUTOMATED COUNT: 13.2 % (ref 10–15)
GLUCOSE UR STRIP-MCNC: NEGATIVE MG/DL
HCT VFR BLD AUTO: 29 % (ref 35–47)
HCT VFR BLD AUTO: 37.1 % (ref 35–47)
HGB BLD-MCNC: 12.5 G/DL (ref 11.7–15.7)
HGB BLD-MCNC: 9.9 G/DL (ref 11.7–15.7)
HGB UR QL STRIP: NEGATIVE
HOLD SPECIMEN: NORMAL
IMM GRANULOCYTES # BLD: 0.2 10E3/UL
IMM GRANULOCYTES NFR BLD: 2 %
KETONES UR STRIP-MCNC: NEGATIVE MG/DL
LEUKOCYTE ESTERASE UR QL STRIP: NEGATIVE
LYMPHOCYTES # BLD AUTO: 1.9 10E3/UL (ref 0.8–5.3)
LYMPHOCYTES NFR BLD AUTO: 14 %
MCH RBC QN AUTO: 31.7 PG (ref 26.5–33)
MCH RBC QN AUTO: 31.8 PG (ref 26.5–33)
MCHC RBC AUTO-ENTMCNC: 33.7 G/DL (ref 31.5–36.5)
MCHC RBC AUTO-ENTMCNC: 34.1 G/DL (ref 31.5–36.5)
MCV RBC AUTO: 93 FL (ref 78–100)
MCV RBC AUTO: 94 FL (ref 78–100)
MONOCYTES # BLD AUTO: 0.9 10E3/UL (ref 0–1.3)
MONOCYTES NFR BLD AUTO: 7 %
NEUTROPHILS # BLD AUTO: 10.1 10E3/UL (ref 1.6–8.3)
NEUTROPHILS NFR BLD AUTO: 76 %
NITRATE UR QL: NEGATIVE
NRBC # BLD AUTO: 0 10E3/UL
NRBC BLD AUTO-RTO: 0 /100
PH UR STRIP: 7 [PH] (ref 5–7)
PLAT MORPH BLD: NORMAL
PLATELET # BLD AUTO: 96 10E3/UL (ref 150–450)
PLATELET # BLD AUTO: 97 10E3/UL (ref 150–450)
PLATELET # BLD AUTO: 98 10E3/UL (ref 150–450)
PLATELET # BLD AUTO: 99 10E3/UL (ref 150–450)
RBC # BLD AUTO: 3.11 10E6/UL (ref 3.8–5.2)
RBC # BLD AUTO: 3.94 10E6/UL (ref 3.8–5.2)
RBC MORPH BLD: NORMAL
RBC URINE: 0 /HPF
SP GR UR STRIP: 1 (ref 1–1.03)
SPECIMEN EXPIRATION DATE: NORMAL
SQUAMOUS EPITHELIAL: <1 /HPF
T PALLIDUM AB SER QL: NONREACTIVE
UROBILINOGEN UR STRIP-MCNC: NORMAL MG/DL
WBC # BLD AUTO: 13.1 10E3/UL (ref 4–11)
WBC # BLD AUTO: 15.9 10E3/UL (ref 4–11)
WBC URINE: 1 /HPF

## 2023-02-16 PROCEDURE — 250N000013 HC RX MED GY IP 250 OP 250 PS 637: Performed by: OBSTETRICS & GYNECOLOGY

## 2023-02-16 PROCEDURE — 85049 AUTOMATED PLATELET COUNT: CPT | Performed by: ADVANCED PRACTICE MIDWIFE

## 2023-02-16 PROCEDURE — 10D17Z9 MANUAL EXTRACTION OF PRODUCTS OF CONCEPTION, RETAINED, VIA NATURAL OR ARTIFICIAL OPENING: ICD-10-PCS | Performed by: OBSTETRICS & GYNECOLOGY

## 2023-02-16 PROCEDURE — 00HU33Z INSERTION OF INFUSION DEVICE INTO SPINAL CANAL, PERCUTANEOUS APPROACH: ICD-10-PCS | Performed by: ANESTHESIOLOGY

## 2023-02-16 PROCEDURE — 250N000011 HC RX IP 250 OP 636

## 2023-02-16 PROCEDURE — 250N000011 HC RX IP 250 OP 636: Performed by: OBSTETRICS & GYNECOLOGY

## 2023-02-16 PROCEDURE — 258N000003 HC RX IP 258 OP 636: Performed by: OBSTETRICS & GYNECOLOGY

## 2023-02-16 PROCEDURE — 250N000009 HC RX 250: Performed by: OBSTETRICS & GYNECOLOGY

## 2023-02-16 PROCEDURE — 85025 COMPLETE CBC W/AUTO DIFF WBC: CPT | Performed by: OBSTETRICS & GYNECOLOGY

## 2023-02-16 PROCEDURE — 250N000011 HC RX IP 250 OP 636: Performed by: ANESTHESIOLOGY

## 2023-02-16 PROCEDURE — 59025 FETAL NON-STRESS TEST: CPT

## 2023-02-16 PROCEDURE — 3E0R3BZ INTRODUCTION OF ANESTHETIC AGENT INTO SPINAL CANAL, PERCUTANEOUS APPROACH: ICD-10-PCS | Performed by: ANESTHESIOLOGY

## 2023-02-16 PROCEDURE — 86780 TREPONEMA PALLIDUM: CPT | Performed by: OBSTETRICS & GYNECOLOGY

## 2023-02-16 PROCEDURE — 999N000016 HC STATISTIC ATTENDANCE AT DELIVERY

## 2023-02-16 PROCEDURE — 87653 STREP B DNA AMP PROBE: CPT | Performed by: OBSTETRICS & GYNECOLOGY

## 2023-02-16 PROCEDURE — 88307 TISSUE EXAM BY PATHOLOGIST: CPT | Mod: 26 | Performed by: PATHOLOGY

## 2023-02-16 PROCEDURE — 10907ZC DRAINAGE OF AMNIOTIC FLUID, THERAPEUTIC FROM PRODUCTS OF CONCEPTION, VIA NATURAL OR ARTIFICIAL OPENING: ICD-10-PCS | Performed by: OBSTETRICS & GYNECOLOGY

## 2023-02-16 PROCEDURE — 0KQM0ZZ REPAIR PERINEUM MUSCLE, OPEN APPROACH: ICD-10-PCS | Performed by: OBSTETRICS & GYNECOLOGY

## 2023-02-16 PROCEDURE — 85049 AUTOMATED PLATELET COUNT: CPT | Performed by: ANESTHESIOLOGY

## 2023-02-16 PROCEDURE — 120N000012 HC R&B POSTPARTUM

## 2023-02-16 PROCEDURE — 88307 TISSUE EXAM BY PATHOLOGIST: CPT | Mod: TC | Performed by: ADVANCED PRACTICE MIDWIFE

## 2023-02-16 PROCEDURE — 722N000001 HC LABOR CARE VAGINAL DELIVERY SINGLE

## 2023-02-16 PROCEDURE — 250N000009 HC RX 250: Performed by: ANESTHESIOLOGY

## 2023-02-16 PROCEDURE — 81001 URINALYSIS AUTO W/SCOPE: CPT | Performed by: OBSTETRICS & GYNECOLOGY

## 2023-02-16 PROCEDURE — G0463 HOSPITAL OUTPT CLINIC VISIT: HCPCS | Mod: 25

## 2023-02-16 PROCEDURE — 36415 COLL VENOUS BLD VENIPUNCTURE: CPT | Performed by: ADVANCED PRACTICE MIDWIFE

## 2023-02-16 PROCEDURE — 86901 BLOOD TYPING SEROLOGIC RH(D): CPT | Performed by: OBSTETRICS & GYNECOLOGY

## 2023-02-16 PROCEDURE — 370N000003 HC ANESTHESIA WARD SERVICE

## 2023-02-16 PROCEDURE — 250N000013 HC RX MED GY IP 250 OP 250 PS 637: Performed by: ADVANCED PRACTICE MIDWIFE

## 2023-02-16 RX ORDER — OXYTOCIN/0.9 % SODIUM CHLORIDE 30/500 ML
340 PLASTIC BAG, INJECTION (ML) INTRAVENOUS CONTINUOUS PRN
Status: DISCONTINUED | OUTPATIENT
Start: 2023-02-16 | End: 2023-02-18 | Stop reason: HOSPADM

## 2023-02-16 RX ORDER — ONDANSETRON 2 MG/ML
4 INJECTION INTRAMUSCULAR; INTRAVENOUS EVERY 6 HOURS PRN
Status: DISCONTINUED | OUTPATIENT
Start: 2023-02-16 | End: 2023-02-16 | Stop reason: HOSPADM

## 2023-02-16 RX ORDER — LIDOCAINE 40 MG/G
CREAM TOPICAL
Status: DISCONTINUED | OUTPATIENT
Start: 2023-02-16 | End: 2023-02-16 | Stop reason: HOSPADM

## 2023-02-16 RX ORDER — PENICILLIN G 3000000 [IU]/50ML
3 INJECTION, SOLUTION INTRAVENOUS EVERY 4 HOURS
Status: DISCONTINUED | OUTPATIENT
Start: 2023-02-16 | End: 2023-02-16 | Stop reason: HOSPADM

## 2023-02-16 RX ORDER — OXYTOCIN/0.9 % SODIUM CHLORIDE 30/500 ML
1-24 PLASTIC BAG, INJECTION (ML) INTRAVENOUS CONTINUOUS
Status: DISCONTINUED | OUTPATIENT
Start: 2023-02-16 | End: 2023-02-16 | Stop reason: HOSPADM

## 2023-02-16 RX ORDER — METOCLOPRAMIDE 10 MG/1
10 TABLET ORAL EVERY 6 HOURS PRN
Status: DISCONTINUED | OUTPATIENT
Start: 2023-02-16 | End: 2023-02-16 | Stop reason: HOSPADM

## 2023-02-16 RX ORDER — METOCLOPRAMIDE HYDROCHLORIDE 5 MG/ML
10 INJECTION INTRAMUSCULAR; INTRAVENOUS EVERY 6 HOURS PRN
Status: DISCONTINUED | OUTPATIENT
Start: 2023-02-16 | End: 2023-02-16 | Stop reason: HOSPADM

## 2023-02-16 RX ORDER — ACETAMINOPHEN 325 MG/1
650 TABLET ORAL EVERY 4 HOURS PRN
Status: DISCONTINUED | OUTPATIENT
Start: 2023-02-16 | End: 2023-02-18 | Stop reason: HOSPADM

## 2023-02-16 RX ORDER — LIDOCAINE HCL/EPINEPHRINE/PF 2%-1:200K
VIAL (ML) INJECTION
Status: COMPLETED | OUTPATIENT
Start: 2023-02-16 | End: 2023-02-16

## 2023-02-16 RX ORDER — OXYTOCIN/0.9 % SODIUM CHLORIDE 30/500 ML
100-340 PLASTIC BAG, INJECTION (ML) INTRAVENOUS CONTINUOUS PRN
Status: DISCONTINUED | OUTPATIENT
Start: 2023-02-16 | End: 2023-02-17

## 2023-02-16 RX ORDER — NALOXONE HYDROCHLORIDE 0.4 MG/ML
0.2 INJECTION, SOLUTION INTRAMUSCULAR; INTRAVENOUS; SUBCUTANEOUS
Status: DISCONTINUED | OUTPATIENT
Start: 2023-02-16 | End: 2023-02-16 | Stop reason: HOSPADM

## 2023-02-16 RX ORDER — DOCUSATE SODIUM 100 MG/1
100 CAPSULE, LIQUID FILLED ORAL DAILY
Status: DISCONTINUED | OUTPATIENT
Start: 2023-02-16 | End: 2023-02-18 | Stop reason: HOSPADM

## 2023-02-16 RX ORDER — OXYTOCIN/0.9 % SODIUM CHLORIDE 30/500 ML
340 PLASTIC BAG, INJECTION (ML) INTRAVENOUS CONTINUOUS PRN
Status: DISCONTINUED | OUTPATIENT
Start: 2023-02-16 | End: 2023-02-16 | Stop reason: HOSPADM

## 2023-02-16 RX ORDER — MISOPROSTOL 200 UG/1
800 TABLET ORAL
Status: DISCONTINUED | OUTPATIENT
Start: 2023-02-16 | End: 2023-02-18 | Stop reason: HOSPADM

## 2023-02-16 RX ORDER — CEFAZOLIN SODIUM 1 G/3ML
INJECTION, POWDER, FOR SOLUTION INTRAMUSCULAR; INTRAVENOUS
Status: COMPLETED
Start: 2023-02-16 | End: 2023-02-16

## 2023-02-16 RX ORDER — MISOPROSTOL 200 UG/1
400 TABLET ORAL
Status: DISCONTINUED | OUTPATIENT
Start: 2023-02-16 | End: 2023-02-16 | Stop reason: HOSPADM

## 2023-02-16 RX ORDER — OXYTOCIN 10 [USP'U]/ML
10 INJECTION, SOLUTION INTRAMUSCULAR; INTRAVENOUS
Status: DISCONTINUED | OUTPATIENT
Start: 2023-02-16 | End: 2023-02-16 | Stop reason: HOSPADM

## 2023-02-16 RX ORDER — OXYTOCIN 10 [USP'U]/ML
10 INJECTION, SOLUTION INTRAMUSCULAR; INTRAVENOUS
Status: DISCONTINUED | OUTPATIENT
Start: 2023-02-16 | End: 2023-02-17

## 2023-02-16 RX ORDER — PROCHLORPERAZINE MALEATE 5 MG
10 TABLET ORAL EVERY 6 HOURS PRN
Status: DISCONTINUED | OUTPATIENT
Start: 2023-02-16 | End: 2023-02-16 | Stop reason: HOSPADM

## 2023-02-16 RX ORDER — MISOPROSTOL 200 UG/1
400 TABLET ORAL
Status: DISCONTINUED | OUTPATIENT
Start: 2023-02-16 | End: 2023-02-18 | Stop reason: HOSPADM

## 2023-02-16 RX ORDER — NALBUPHINE HYDROCHLORIDE 10 MG/ML
2.5-5 INJECTION, SOLUTION INTRAMUSCULAR; INTRAVENOUS; SUBCUTANEOUS EVERY 6 HOURS PRN
Status: DISCONTINUED | OUTPATIENT
Start: 2023-02-16 | End: 2023-02-17

## 2023-02-16 RX ORDER — CARBOPROST TROMETHAMINE 250 UG/ML
250 INJECTION, SOLUTION INTRAMUSCULAR
Status: DISCONTINUED | OUTPATIENT
Start: 2023-02-16 | End: 2023-02-16 | Stop reason: HOSPADM

## 2023-02-16 RX ORDER — ONDANSETRON 4 MG/1
4 TABLET, ORALLY DISINTEGRATING ORAL EVERY 6 HOURS PRN
Status: DISCONTINUED | OUTPATIENT
Start: 2023-02-16 | End: 2023-02-16 | Stop reason: HOSPADM

## 2023-02-16 RX ORDER — METHYLERGONOVINE MALEATE 0.2 MG/ML
200 INJECTION INTRAVENOUS
Status: DISCONTINUED | OUTPATIENT
Start: 2023-02-16 | End: 2023-02-16 | Stop reason: HOSPADM

## 2023-02-16 RX ORDER — EPHEDRINE SULFATE 50 MG/ML
INJECTION, SOLUTION INTRAMUSCULAR; INTRAVENOUS; SUBCUTANEOUS
Status: DISCONTINUED
Start: 2023-02-16 | End: 2023-02-16 | Stop reason: WASHOUT

## 2023-02-16 RX ORDER — PROCHLORPERAZINE 25 MG
25 SUPPOSITORY, RECTAL RECTAL EVERY 12 HOURS PRN
Status: DISCONTINUED | OUTPATIENT
Start: 2023-02-16 | End: 2023-02-16 | Stop reason: HOSPADM

## 2023-02-16 RX ORDER — HYDROCORTISONE 25 MG/G
CREAM TOPICAL 3 TIMES DAILY PRN
Status: DISCONTINUED | OUTPATIENT
Start: 2023-02-16 | End: 2023-02-18 | Stop reason: HOSPADM

## 2023-02-16 RX ORDER — KETOROLAC TROMETHAMINE 30 MG/ML
30 INJECTION, SOLUTION INTRAMUSCULAR; INTRAVENOUS
Status: DISCONTINUED | OUTPATIENT
Start: 2023-02-16 | End: 2023-02-17

## 2023-02-16 RX ORDER — OXYTOCIN 10 [USP'U]/ML
10 INJECTION, SOLUTION INTRAMUSCULAR; INTRAVENOUS
Status: DISCONTINUED | OUTPATIENT
Start: 2023-02-16 | End: 2023-02-18 | Stop reason: HOSPADM

## 2023-02-16 RX ORDER — NALOXONE HYDROCHLORIDE 0.4 MG/ML
0.4 INJECTION, SOLUTION INTRAMUSCULAR; INTRAVENOUS; SUBCUTANEOUS
Status: DISCONTINUED | OUTPATIENT
Start: 2023-02-16 | End: 2023-02-16 | Stop reason: HOSPADM

## 2023-02-16 RX ORDER — IBUPROFEN 400 MG/1
800 TABLET, FILM COATED ORAL EVERY 6 HOURS PRN
Status: DISCONTINUED | OUTPATIENT
Start: 2023-02-16 | End: 2023-02-18 | Stop reason: HOSPADM

## 2023-02-16 RX ORDER — CARBOPROST TROMETHAMINE 250 UG/ML
250 INJECTION, SOLUTION INTRAMUSCULAR
Status: DISCONTINUED | OUTPATIENT
Start: 2023-02-16 | End: 2023-02-18 | Stop reason: HOSPADM

## 2023-02-16 RX ORDER — CITRIC ACID/SODIUM CITRATE 334-500MG
30 SOLUTION, ORAL ORAL
Status: DISCONTINUED | OUTPATIENT
Start: 2023-02-16 | End: 2023-02-16 | Stop reason: HOSPADM

## 2023-02-16 RX ORDER — NALOXONE HYDROCHLORIDE 0.4 MG/ML
0.2 INJECTION, SOLUTION INTRAMUSCULAR; INTRAVENOUS; SUBCUTANEOUS
Status: DISCONTINUED | OUTPATIENT
Start: 2023-02-16 | End: 2023-02-18 | Stop reason: HOSPADM

## 2023-02-16 RX ORDER — SODIUM CHLORIDE, SODIUM LACTATE, POTASSIUM CHLORIDE, CALCIUM CHLORIDE 600; 310; 30; 20 MG/100ML; MG/100ML; MG/100ML; MG/100ML
INJECTION, SOLUTION INTRAVENOUS CONTINUOUS
Status: DISCONTINUED | OUTPATIENT
Start: 2023-02-16 | End: 2023-02-16 | Stop reason: HOSPADM

## 2023-02-16 RX ORDER — OXYCODONE HYDROCHLORIDE 5 MG/1
5 TABLET ORAL EVERY 4 HOURS PRN
Status: DISCONTINUED | OUTPATIENT
Start: 2023-02-16 | End: 2023-02-18 | Stop reason: HOSPADM

## 2023-02-16 RX ORDER — TRANEXAMIC ACID 10 MG/ML
1 INJECTION, SOLUTION INTRAVENOUS EVERY 30 MIN PRN
Status: DISCONTINUED | OUTPATIENT
Start: 2023-02-16 | End: 2023-02-18 | Stop reason: HOSPADM

## 2023-02-16 RX ORDER — FENTANYL CITRATE-0.9 % NACL/PF 10 MCG/ML
100 PLASTIC BAG, INJECTION (ML) INTRAVENOUS EVERY 5 MIN PRN
Status: DISCONTINUED | OUTPATIENT
Start: 2023-02-16 | End: 2023-02-16 | Stop reason: HOSPADM

## 2023-02-16 RX ORDER — CEFAZOLIN SODIUM 1 G/3ML
1 INJECTION, POWDER, FOR SOLUTION INTRAMUSCULAR; INTRAVENOUS ONCE
Status: DISCONTINUED | OUTPATIENT
Start: 2023-02-16 | End: 2023-02-17

## 2023-02-16 RX ORDER — TRANEXAMIC ACID 10 MG/ML
1 INJECTION, SOLUTION INTRAVENOUS EVERY 30 MIN PRN
Status: DISCONTINUED | OUTPATIENT
Start: 2023-02-16 | End: 2023-02-16 | Stop reason: HOSPADM

## 2023-02-16 RX ORDER — NALOXONE HYDROCHLORIDE 0.4 MG/ML
0.4 INJECTION, SOLUTION INTRAMUSCULAR; INTRAVENOUS; SUBCUTANEOUS
Status: DISCONTINUED | OUTPATIENT
Start: 2023-02-16 | End: 2023-02-18 | Stop reason: HOSPADM

## 2023-02-16 RX ORDER — METHYLERGONOVINE MALEATE 0.2 MG/ML
200 INJECTION INTRAVENOUS
Status: DISCONTINUED | OUTPATIENT
Start: 2023-02-16 | End: 2023-02-18 | Stop reason: HOSPADM

## 2023-02-16 RX ORDER — MISOPROSTOL 200 UG/1
800 TABLET ORAL
Status: DISCONTINUED | OUTPATIENT
Start: 2023-02-16 | End: 2023-02-16 | Stop reason: HOSPADM

## 2023-02-16 RX ORDER — IBUPROFEN 400 MG/1
800 TABLET, FILM COATED ORAL
Status: DISCONTINUED | OUTPATIENT
Start: 2023-02-16 | End: 2023-02-17

## 2023-02-16 RX ORDER — MODIFIED LANOLIN
OINTMENT (GRAM) TOPICAL
Status: DISCONTINUED | OUTPATIENT
Start: 2023-02-16 | End: 2023-02-18 | Stop reason: HOSPADM

## 2023-02-16 RX ORDER — BISACODYL 10 MG
10 SUPPOSITORY, RECTAL RECTAL DAILY PRN
Status: DISCONTINUED | OUTPATIENT
Start: 2023-02-16 | End: 2023-02-18 | Stop reason: HOSPADM

## 2023-02-16 RX ORDER — PENICILLIN G POTASSIUM 5000000 [IU]/1
5 INJECTION, POWDER, FOR SOLUTION INTRAMUSCULAR; INTRAVENOUS ONCE
Status: COMPLETED | OUTPATIENT
Start: 2023-02-16 | End: 2023-02-16

## 2023-02-16 RX ORDER — SODIUM CHLORIDE, SODIUM LACTATE, POTASSIUM CHLORIDE, CALCIUM CHLORIDE 600; 310; 30; 20 MG/100ML; MG/100ML; MG/100ML; MG/100ML
INJECTION, SOLUTION INTRAVENOUS CONTINUOUS PRN
Status: DISCONTINUED | OUTPATIENT
Start: 2023-02-16 | End: 2023-02-16 | Stop reason: HOSPADM

## 2023-02-16 RX ADMIN — MISOPROSTOL 800 MCG: 200 TABLET ORAL at 14:37

## 2023-02-16 RX ADMIN — PENICILLIN G 3 MILLION UNITS: 3000000 INJECTION, SOLUTION INTRAVENOUS at 12:50

## 2023-02-16 RX ADMIN — PENICILLIN G POTASSIUM 5 MILLION UNITS: 5000000 POWDER, FOR SOLUTION INTRAMUSCULAR; INTRAPLEURAL; INTRATHECAL; INTRAVENOUS at 04:12

## 2023-02-16 RX ADMIN — IBUPROFEN 800 MG: 400 TABLET ORAL at 16:15

## 2023-02-16 RX ADMIN — ACETAMINOPHEN 650 MG: 325 TABLET, FILM COATED ORAL at 22:55

## 2023-02-16 RX ADMIN — SODIUM CHLORIDE, POTASSIUM CHLORIDE, SODIUM LACTATE AND CALCIUM CHLORIDE: 600; 310; 30; 20 INJECTION, SOLUTION INTRAVENOUS at 04:10

## 2023-02-16 RX ADMIN — IBUPROFEN 800 MG: 400 TABLET ORAL at 22:55

## 2023-02-16 RX ADMIN — Medication 12 ML/HR: at 09:45

## 2023-02-16 RX ADMIN — Medication 2 MILLI-UNITS/MIN: at 12:46

## 2023-02-16 RX ADMIN — CEFAZOLIN 1 G: 1 INJECTION, POWDER, FOR SOLUTION INTRAMUSCULAR; INTRAVENOUS at 14:52

## 2023-02-16 RX ADMIN — LIDOCAINE HYDROCHLORIDE,EPINEPHRINE BITARTRATE 5 ML: 20; .005 INJECTION, SOLUTION EPIDURAL; INFILTRATION; INTRACAUDAL; PERINEURAL at 09:50

## 2023-02-16 RX ADMIN — ACETAMINOPHEN 650 MG: 325 TABLET, FILM COATED ORAL at 16:15

## 2023-02-16 RX ADMIN — PENICILLIN G 3 MILLION UNITS: 3000000 INJECTION, SOLUTION INTRAVENOUS at 08:11

## 2023-02-16 ASSESSMENT — ACTIVITIES OF DAILY LIVING (ADL)
ADLS_ACUITY_SCORE: 18

## 2023-02-16 NOTE — PLAN OF CARE
1 para 0 at 35 5/7 weeks gestation admitted with spontaneous labor.  External monitors in place.  Penicillin started for unknown GBS status.  Pt unsure if she wishes for an epidural for pain management, will evaluate as her labor progresses.  Pt has been independently moving at the bedside.  Denies needs at this time.   present and supportive.

## 2023-02-16 NOTE — PLAN OF CARE
of viable female infant. Delivery present for the birth due to 35+5 week delivery and meconium fluid.

## 2023-02-16 NOTE — PLAN OF CARE
" 35-5 week patient admitted to [facility], ambulatory per services of Dr Schofield for evaluation of cramping.  Pt states she began having some cramping at 1700 and it continued q4-7 minutes at home. She called and spoke to Dr Schofield and was able to rest and \"maybe fell asleep for an hour.\" But woke up and was having more cramping, so wanted to be evaluated.  Discussed plan of care including EFM with NST, routine VS, UA/Wetprep, and SVE.  Pt agreeable.  EFM applied and admission assessment completed.      "

## 2023-02-16 NOTE — ANESTHESIA PROCEDURE NOTES
Epidural catheter Procedure Note    Pre-Procedure   Staff -        Anesthesiologist:  Micha Corbett MD       Performed By: anesthesiologist       Location: OB       Pre-Anesthestic Checklist: patient identified, IV checked, risks and benefits discussed, informed consent, pre-op evaluation and at physician/surgeon's request  Timeout:       Correct Patient: Yes        Correct Procedure: Yes        Correct Position: Yes   Procedure Documentation  Procedure: epidural catheter       Patient Position: sitting       Patient Prep/Sterile Barriers: sterile gloves, mask, patient draped, 3 applications of betadine, and waited for it to completely dry       Skin prep: Betadine       Local skin infiltrated with 3 mL of 1% lidocaine.        Insertion Site: L4-5. (midline approach).       Technique: LORT air        CARMELO at 5 cm.       Needle Type: ToAngelPrimey needle       Needle Gauge: 17.        Needle Length (Inches): 3.5        Catheter: 19 G.          Catheter threaded easily.         4 cm epidural space.           # of attempts: 1 and  # of redirects:     Assessment/Narrative         Paresthesias: No.       Test dose of mL lidocaine 1.5% w/ 1:200,000 epinephrine at.         Test dose negative, 3 minutes after injection, for signs of intravascular, subdural, or intrathecal injection.       Insertion/Infusion Method: LORT air       Aspiration negative for Heme or CSF via Epidural Catheter.    Medication(s) Administered   0.125% Bupivacaine + 2 mcg/mL Fentanyl via CADD (Epidural) - EPIDURAL   9 mL - 2/16/2023 9:51:00 AM  2% Lidocaine w/ 1:200K Epi (EPIDURAL) - EPIDURAL   5 mL - 2/16/2023 9:50:00 AM   Comments:  Test dose of 3cc negative, then additional 2cc of test dose given.    Adhesive spray, tegaderm, and tape to secure  Epidural bolused with pump mix    Orders to manage the epidural infusion have been entered and, through coordination with the nurse, we will continue to manage and monitor the patient's labor epidural.  We  "will continuously be available to adjust as needed throughout the entire labor and delivery process.        FOR [facility] ONLY:   Pain Team Contact information: please page the Pain Team Via Index. Search \"Pain\". During daytime hours, please page the attending first. At night please page the resident first.    "

## 2023-02-16 NOTE — PLAN OF CARE
Sujatha ambulated to the bathroom and voided in adequate amount. She and her  daughter were then transferred to room 214 via wheelchair. Report was given to Eboni CARBAJAL RN who will now assume care.

## 2023-02-16 NOTE — PROGRESS NOTES
OB Progress Note  2023  8:08 AM    S:  Pt with no medication pain control.  Contractions more painful, now with N/V as well. Requesting epidural, awaiting platelet recheck results. No other complaints.      O:  /71   Pulse 85   Temp 98.1  F (36.7  C) (Temporal)   Resp 18   LMP 2022   EFM: baseline 130, accelerations present, no decelerations, moderate variability; Category 1  [place]:  Ctx q2-4 min  SVE:  Deferred   Membranes: intact    Pitocin at 0 mU/min    Lab Results   Component Value Date    WBC 13.1 (H) 2023    HGB 12.5 2023    HCT 37.1 2023    MCV 94 2023    PLT 97 (L) 2023       A/P:  32 year old  @35w5d admitted with  labor. Gestational thrombocytopenia  1.  Routine cares  2.  Epidural - await platelet repeat results per anethesia  3. Continue PCN GBS ppx, second dose due now.  4. AROM when appropriate.  5. Anticipate   6. Plan NICU at delivery for  gestation.    Angelia Flores MD

## 2023-02-16 NOTE — PROVIDER NOTIFICATION
02/16/23 0323   Provider Notification   Provider Name/Title Dr Schofield   Method of Notification Phone   Request Evaluate - Remote   Notification Reason Patient Arrived;SVE;Uterine Activity     Update that patient arrived to be evaluated for cramping. She was able to fall asleep for maybe an hour after speaking with you on the phone but when she woke up she was having more cramping. Patient looks uncomfortable in bed, breathing through contractions. UA pending. SVE: 6-7/90 w/a bulging bag of water. Category 1 tracing on baby. VSS. History of gestational thrombocytopenia. GBS collected. Wet prep collected but not sent yet.     Orders to admit patient. Penicillin for unknown GBS. No need to send wet prep.

## 2023-02-16 NOTE — ANESTHESIA PREPROCEDURE EVALUATION
Anesthesia Pre-Procedure Evaluation    Patient: Sujatha Vasquez   MRN: [id number] : 1990        Procedure : * No procedures listed *          Past Medical History:   Diagnosis Date     Thrombocytopenia (H)       No past surgical history on file.   Allergies   Allergen Reactions     Seasonale      Nickel Rash      Social History     Tobacco Use     Smoking status: Never     Smokeless tobacco: Never   Substance Use Topics     Alcohol use: Not Currently     Comment: currently pregnant      Wt Readings from Last 1 Encounters:   10/14/22 71.2 kg (157 lb)        Anesthesia Evaluation   Pt has had prior anesthetic.     No history of anesthetic complications       ROS/MED HX  ENT/Pulmonary:    (-) sleep apnea   Neurologic:       Cardiovascular:       METS/Exercise Tolerance:     Hematologic: Comments: Gestational thrombocytopenia, platelets 98      Musculoskeletal:       GI/Hepatic:       Renal/Genitourinary:       Endo:       Psychiatric/Substance Use:       Infectious Disease:       Malignancy:       Other:            Physical Exam    Airway        Mallampati: II   TM distance: > 3 FB   Neck ROM: full   Mouth opening: > 3 cm    Respiratory Devices and Support         Dental       (+) Minor Abnormalities - some fillings, tiny chips      Cardiovascular   cardiovascular exam normal          Pulmonary   pulmonary exam normal                OUTSIDE LABS:  CBC:   Lab Results   Component Value Date    WBC 13.1 (H) 2023    WBC 11.4 (H) 2023    HGB 12.5 2023    HGB 13.1 2023    HCT 37.1 2023    HCT 40.3 2023    PLT 98 (L) 2023    PLT 97 (L) 2023    PLT 96 (L) 2023     BMP:   Lab Results   Component Value Date     2022    POTASSIUM 3.7 2022    CHLORIDE 103 2022    CO2 28 2022    BUN 7 2022    CR 0.52 2022    GLC 84 2023    GLC 92 2022     COAGS: No results found for: PTT, INR, FIBR  POC: No results found for: BGM,  HCG, HCGS  HEPATIC: No results found for: ALBUMIN, PROTTOTAL, ALT, AST, GGT, ALKPHOS, BILITOTAL, BILIDIRECT, TRICIA  OTHER:   Lab Results   Component Value Date    KUSHAL 8.9 11/01/2022    MAG 1.9 10/14/2022    TSH 1.92 10/14/2022       Anesthesia Plan    ASA Status:  2      Anesthesia Type: Epidural.              Consents    Anesthesia Plan(s) and associated risks, benefits, and realistic alternatives discussed. Questions answered and patient/representative(s) expressed understanding.    - Discussed:     - Discussed with:  Patient         Postoperative Care            Comments:    Other Comments: Primip, 8cm (attempted no epidural)            Micha Corbett MD

## 2023-02-16 NOTE — H&P
OB Brief Admit H&P    No significant change in general health status based on examination of the patient, review of Nursing Admission Database and prenatal record.    Pt is a 32 year old  @ 35w5d who presented to L&D with labor.    Patient's prenatal course has been complicated by:  1. Gestational thrombocytopenia - plts 125K last week  2. S>D, growth U/S last week EFW 6.6 lbs      Prenatal Labs:    Blood type O+  Rubella immune/HIV neg/RPR NR/Hep C neg/HepBSAg neg    GBS not yet done    EFW: 7 lbs    /73   Temp 98.1  F (36.7  C) (Temporal)   LMP 2022   EFM:  125, moderate variability, + accels, no decels  [place]: ctx q2-4 min  SVE: 6-7/90%, BBOW per RN  Membranes:  intact    Assessment:  32 year old  @ 35w5d admitted for PTL.    Plan:  1. Admit to labor and delivery   2. Epidural if desired and able  3. GBS unknown, will send and treat with PCN  4. Gestational thrombocytopenia - will check platelets with admission labs.  5. Anticipate   6. Received flu shot and Tdap.    Nesha Schofield MD  2023  3:56 AM

## 2023-02-16 NOTE — L&D DELIVERY NOTE
OB Vaginal Delivery Note    Sujatha Vasquez MRN# [id number]   Age: 32 year old YOB: 1990       GA: 35w5d  GP:   Labor Complications: None   EBL:   mL  Delivery QBL: 1071 mL  Delivery Type: Vaginal, Spontaneous   ROM to Delivery Time: (Delivered) Hours: 2 Minutes: 30  [place] Weight: 3.31 kg (7 lb 4.8 oz)    1 Minute 5 Minute 10 Minute   Apgar Totals: 8   9               HPI:  Pt is a 32 year old  @ 35w5d who presented to L&D on 2023 for spontaneous  labor.     Prenatal labs:  O POS antibody screen: negative, Rubella Immune,  Hep B/HIV/RPR all negative, GC/CT negative, GCT passed, GBS unknown    Pregnancy complications: Gestational thrombocytopenia, size > dates    Hospital Course:    First Stage: On admission, contractions were every 2-4 minutes and patient was 6cm dilated. FHTs were in the 120s with accelerations present. moderate variability,  no decelerations noted.   Abdomen was non-tender.  EFW was 7lb by growth US last week.  Patient's labor progressed spontaneously.    At the patient's request, she received an epidural for  analgesia.  She did not receive pitocin for augmentation of labor. AROM occurred at 1115 with light meconium fluid noted.  Patient reached complete cervical dilation at 1330 on 2023.    Second Stage:  Patient did not  labor down , and began pushing at 1335.  Good maternal expulsive efforts were noted.  Fetal heart tones remained reassuring during the second stage.  Baseline 110s, with moderate variability, rare variable decelerations noted.  She was able to bring the fetal vertex to a full crown.  The fetal vertex was then easily delivered, followed by the fetal shoulders without complications.  A  nuchal cord was not present, though the cord was noted to be unusually short.  A female infant was then delivered without complications at 1345 on 2023.  The mouth and nares were bulb suctioned.  The cord was clamped after delayed cord  clamping, cut and the infant was placed on the maternal abdomen.  The infant's weight was 7 pounds 5 ounces.  Apgars were 8 and 9 at one and five minutes .       Third Stage:  The placenta then delivered by manual extraction by Dr. Stearns, see note. The patient's perineum was inspected and  A second degree laceration was found .  The area was repaired in the usual fashion using 3-0 vicryl suture by Dr. Stearns, see note.     Sponge and needle counts were correct.  The patient and infant remained in the delivery suite following delivery in stable condition.      SAPNA Graves CNM OB/GYN  2023  2:53 PM          Claudio Vasquez [[id number]]    Labor Event Times    Labor onset date: 2/15/23 Onset time:  5:00 PM   Dilation complete date: 23 Complete time:  1:30 PM   Start pushing date/time: 2023 1335      Labor Length    1st Stage (hrs): 20 (min): 30   2nd Stage (hrs): 0 (min): 15      Labor Events     labor?: Yes   steroids: None  Labor Type: Spontaneous  Predominate monitoring during 1st stage: continuous electronic fetal monitoring     Antibiotics received during labor?: Yes  Reason for Antibiotics: GBS  Antibiotics received for GBS: Penicillin  Antibiotics Given (GBS): Greater than 4 hours prior to delivery     Rupture date/time: 23 1115   Rupture type: Artificial Rupture of Membranes  Fluid color: Meconium  Fluid odor: Normal     Augmentation: AROM  Indications for augmentation: Ineffective Contraction Pattern     Delivery/Placenta Date and Time    Delivery Date: 23 Delivery Time:  1:45 PM   Oxytocin given at the time of delivery: after delivery of placenta          Vaginal Counts     Initial count performed by 2 team members:  Two Team Members   Felicita Barraza RN       Needles Suture Needles Sponges (RETIRED) Instruments   Initial counts 2  5    Added to count  2     Relief counts       Final counts 2 2 5          Placed during labor Accounted  "for at the end of labor   FSE     IUPC     Cervidil                Final count performed by 2 team members:  Two Team Members   Felicita Barraza RN         Apgars    Living status: Living   1 Minute 5 Minute 10 Minute 15 Minute 20 Minute   Skin color: 0  1       Heart rate: 2  2       Reflex irritability: 2  2       Muscle tone: 2  2       Respiratory effort: 2  2       Total: 8  9       Apgars assigned by: ZA ALEJO CNP     Cord    Vessels: 3 Vessels    Cord Complications: None               Stem cell collection?: No       [place] Resuscitation    Methods: None   Care at Delivery: NICU team called to L & D on behalf of Dr Nesha Schofield for late  delivery with meconium stained amniotic fluid. Infant cried immediately after her birth, vigorous and received 1 minute of delayed cord clamping. She was then brought to the radiant warmer where she was further dried and stimulated, dusky color and an oximeter was placed. Her saturations quickly increased into the 80s, then 90s on room air. HR 140s, easy breathing and moving all extremities well. On examination, she is noted to have many features of Trisomy 21 which include short neck, excess nuchal skin, protruding tongue, flat nasal bridge, upslanting palpebral fissures, epicanthal folds, small ears (<3 cm), clinodactyly, and single transverse perea crease. This has not been prenatal addressed, I will go back and speak with parents in 1 hour. Parents are aware she is doing well. Plan for NBN management.  Za ALEJO CNP 2023 2:13 PM  Output in Delivery Room: Voided     [place] Measurements    Weight: 7 lb 4.8 oz Length: 1' 7.5\"   Head circumference: 33 cm    Output in delivery room: Voided     Labor Events and Shoulder Dystocia    Fetal Tracing Prior to Delivery: Category 2  Fetal Tracing Comments: variable decelerations with pushing, though broken tracing  Shoulder dystocia present?: Neg     Delivery (Maternal) " (Provider to Complete) ([id number])    Perineal lacerations: 2nd Repaired?: Yes   Repair suture: 3-0 Vicryl  Number of repair packets: 2  Genital tract inspection done: Pos     Blood Loss  Mother: Sujatha Vasquez #[id number]   Start of Mother's Information    Delivery Blood Loss  02/15/23 1700 - 02/16/23 1459    Delivery QBL (mL) Hospital Encounter 1071 mL    Total  1071 mL         End of Mother's Information  Mother: Sujatha Vasquez #[id number]          Delivery - Provider to Complete ([id number])    CNM Care: Any CNM care in labor  Attempted Delivery Types (Choose all that apply): Spontaneous Vaginal Delivery  Delivery Type (Choose the 1 that will go to the Birth History): Vaginal, Spontaneous                                 Placenta    Removal: Manual Removal  Comments: Very short cord, baby placed on maternal pubis until cord was clamped and cut at 60sec.  Disposition: Pathology           Anesthesia    Method: Epidural                Presentation and Position    Presentation: Vertex    Position: Left Occiput Anterior                 SAPNA Graves CNM

## 2023-02-16 NOTE — PROCEDURES
Called to bedside for retained placenta.  S/p  at 1345 by CAS De Guzman. Placenta still in situ.      - Recommended manual extraction of placenta.  Mother with adequate pain control from epidural. Placenta then removed completely with manual extraction. Uterine sweep performed to ensure entire placenta removed.   - Remaining second degree repaired in the usual fashion with 3-0 vicryl.    - IV pitocin running and Rectal cytotec 800 mcg placed.  - Fundus firm  - QBL 1000 and will weigh laps.    - CBC to collect 2 hours post delivery. Currently vitally stable.  - Plan for 1 g Ancef IV for prophylaxis after sweep    Jhoana Stearns MD

## 2023-02-17 LAB
BASOPHILS # BLD AUTO: 0.1 10E3/UL (ref 0–0.2)
BASOPHILS # BLD AUTO: 0.1 10E3/UL (ref 0–0.2)
BASOPHILS NFR BLD AUTO: 0 %
BASOPHILS NFR BLD AUTO: 1 %
EOSINOPHIL # BLD AUTO: 0 10E3/UL (ref 0–0.7)
EOSINOPHIL # BLD AUTO: 0.1 10E3/UL (ref 0–0.7)
EOSINOPHIL NFR BLD AUTO: 0 %
EOSINOPHIL NFR BLD AUTO: 0 %
ERYTHROCYTE [DISTWIDTH] IN BLOOD BY AUTOMATED COUNT: 13.2 % (ref 10–15)
ERYTHROCYTE [DISTWIDTH] IN BLOOD BY AUTOMATED COUNT: 13.3 % (ref 10–15)
GP B STREP DNA SPEC QL NAA+PROBE: POSITIVE
HCT VFR BLD AUTO: 27.9 % (ref 35–47)
HCT VFR BLD AUTO: 35.3 % (ref 35–47)
HGB BLD-MCNC: 11.5 G/DL (ref 11.7–15.7)
HGB BLD-MCNC: 9.4 G/DL (ref 11.7–15.7)
IMM GRANULOCYTES # BLD: 0.2 10E3/UL
IMM GRANULOCYTES # BLD: 0.2 10E3/UL
IMM GRANULOCYTES NFR BLD: 1 %
IMM GRANULOCYTES NFR BLD: 1 %
LYMPHOCYTES # BLD AUTO: 1.3 10E3/UL (ref 0.8–5.3)
LYMPHOCYTES # BLD AUTO: 1.8 10E3/UL (ref 0.8–5.3)
LYMPHOCYTES NFR BLD AUTO: 11 %
LYMPHOCYTES NFR BLD AUTO: 15 %
MCH RBC QN AUTO: 31.7 PG (ref 26.5–33)
MCH RBC QN AUTO: 32.4 PG (ref 26.5–33)
MCHC RBC AUTO-ENTMCNC: 32.6 G/DL (ref 31.5–36.5)
MCHC RBC AUTO-ENTMCNC: 33.7 G/DL (ref 31.5–36.5)
MCV RBC AUTO: 96 FL (ref 78–100)
MCV RBC AUTO: 97 FL (ref 78–100)
MONOCYTES # BLD AUTO: 0.6 10E3/UL (ref 0–1.3)
MONOCYTES # BLD AUTO: 0.9 10E3/UL (ref 0–1.3)
MONOCYTES NFR BLD AUTO: 5 %
MONOCYTES NFR BLD AUTO: 7 %
NEUTROPHILS # BLD AUTO: 9 10E3/UL (ref 1.6–8.3)
NEUTROPHILS # BLD AUTO: 9.6 10E3/UL (ref 1.6–8.3)
NEUTROPHILS NFR BLD AUTO: 77 %
NEUTROPHILS NFR BLD AUTO: 82 %
NRBC # BLD AUTO: 0 10E3/UL
NRBC # BLD AUTO: 0 10E3/UL
NRBC BLD AUTO-RTO: 0 /100
NRBC BLD AUTO-RTO: 0 /100
PLATELET # BLD AUTO: 103 10E3/UL (ref 150–450)
PLATELET # BLD AUTO: 88 10E3/UL (ref 150–450)
RBC # BLD AUTO: 2.9 10E6/UL (ref 3.8–5.2)
RBC # BLD AUTO: 3.63 10E6/UL (ref 3.8–5.2)
WBC # BLD AUTO: 11.7 10E3/UL (ref 4–11)
WBC # BLD AUTO: 12 10E3/UL (ref 4–11)

## 2023-02-17 PROCEDURE — 85025 COMPLETE CBC W/AUTO DIFF WBC: CPT | Performed by: OBSTETRICS & GYNECOLOGY

## 2023-02-17 PROCEDURE — 250N000013 HC RX MED GY IP 250 OP 250 PS 637: Performed by: ADVANCED PRACTICE MIDWIFE

## 2023-02-17 PROCEDURE — 120N000012 HC R&B POSTPARTUM

## 2023-02-17 PROCEDURE — 36415 COLL VENOUS BLD VENIPUNCTURE: CPT | Performed by: OBSTETRICS & GYNECOLOGY

## 2023-02-17 PROCEDURE — 250N000013 HC RX MED GY IP 250 OP 250 PS 637

## 2023-02-17 PROCEDURE — 250N000013 HC RX MED GY IP 250 OP 250 PS 637: Performed by: OBSTETRICS & GYNECOLOGY

## 2023-02-17 PROCEDURE — 250N000009 HC RX 250: Performed by: OBSTETRICS & GYNECOLOGY

## 2023-02-17 RX ORDER — METHYLERGONOVINE MALEATE 0.2 MG/1
200 TABLET ORAL EVERY 6 HOURS SCHEDULED
Status: COMPLETED | OUTPATIENT
Start: 2023-02-17 | End: 2023-02-18

## 2023-02-17 RX ORDER — METHYLERGONOVINE MALEATE 0.2 MG/1
200 TABLET ORAL EVERY 6 HOURS SCHEDULED
Status: DISCONTINUED | OUTPATIENT
Start: 2023-02-17 | End: 2023-02-17

## 2023-02-17 RX ORDER — FERROUS SULFATE 325(65) MG
325 TABLET ORAL DAILY
Status: DISCONTINUED | OUTPATIENT
Start: 2023-02-17 | End: 2023-02-18 | Stop reason: HOSPADM

## 2023-02-17 RX ORDER — TRANEXAMIC ACID 10 MG/ML
1 INJECTION, SOLUTION INTRAVENOUS ONCE
Status: COMPLETED | OUTPATIENT
Start: 2023-02-17 | End: 2023-02-17

## 2023-02-17 RX ADMIN — METHYLERGONOVINE MALEATE 200 MCG: 0.2 TABLET ORAL at 03:13

## 2023-02-17 RX ADMIN — IBUPROFEN 800 MG: 400 TABLET ORAL at 05:12

## 2023-02-17 RX ADMIN — IBUPROFEN 800 MG: 400 TABLET ORAL at 11:10

## 2023-02-17 RX ADMIN — ACETAMINOPHEN 650 MG: 325 TABLET, FILM COATED ORAL at 17:20

## 2023-02-17 RX ADMIN — DOCUSATE SODIUM 100 MG: 100 CAPSULE, LIQUID FILLED ORAL at 08:49

## 2023-02-17 RX ADMIN — ACETAMINOPHEN 650 MG: 325 TABLET, FILM COATED ORAL at 11:10

## 2023-02-17 RX ADMIN — FERROUS SULFATE TAB 325 MG (65 MG ELEMENTAL FE) 325 MG: 325 (65 FE) TAB at 08:49

## 2023-02-17 RX ADMIN — METHYLERGONOVINE MALEATE 200 MCG: 0.2 TABLET ORAL at 08:50

## 2023-02-17 RX ADMIN — METHYLERGONOVINE MALEATE 200 MCG: 0.2 TABLET ORAL at 15:51

## 2023-02-17 RX ADMIN — IBUPROFEN 800 MG: 400 TABLET ORAL at 17:20

## 2023-02-17 RX ADMIN — ACETAMINOPHEN 650 MG: 325 TABLET, FILM COATED ORAL at 05:12

## 2023-02-17 RX ADMIN — TRANEXAMIC ACID 1 G: 10 INJECTION, SOLUTION INTRAVENOUS at 03:10

## 2023-02-17 ASSESSMENT — ACTIVITIES OF DAILY LIVING (ADL)
ADLS_ACUITY_SCORE: 18

## 2023-02-17 NOTE — PROVIDER NOTIFICATION
02/17/23 0225   Provider Notification   Provider Name/Title Dr. Ch   Method of Notification Electronic Page   Request Evaluate-Remote   Notification Reason Status Update   Provider notified with pt status update. Pt having light trickle of blood with fundal checks. Uterus firm and midline. Hx gestational thrombocytopenia- platelets 99. Hgb 9.9. BP 97/58, denies dizziness.     Orders: Oral Methergine 200mcg q6hrs for 24hrs. STAT CBC with platelets. Okay to repeat vitals and fundal check in 4 hours. Notify for hgb less than  8. 1g Tranexamic acid IV one time.

## 2023-02-17 NOTE — PLAN OF CARE
VSS, working on breastfeeding.  Fundus is firm at U/2, scant flow, no clots.  Pain controlled with Tylenol, Ibuprofen, ice packs and tucks pads.  SBA with activity as right leg is a little tingly per pt report.   is at bedside and supportive.  Awaiting lab draw for CBC and PIV remains in place.  Pt is voiding and tolerating a regular diet.  Will continue to monitor and support.

## 2023-02-17 NOTE — PROGRESS NOTES
OB Post-partum Note  PPD# 1    S:  Patient doing well.  Pain controlled with tylenol and ibuprofen.  Voiding without difficulty.  Bleeding now stable, like a period.  Breast feeding and pumping.    O:  BP 94/56 (BP Location: Right arm, Patient Position: Semi-Guevara's, Cuff Size: Adult Regular)   Pulse 81   Temp 98.4  F (36.9  C) (Oral)   Resp 16   LMP 2022   SpO2 100%   Breastfeeding Unknown   Gen- A&O, NAD  Abd- Non-tender, fundus firm at umbilicus  Ext- non-tender, no edema    Hemoglobin   Date Value Ref Range Status   2023 9.4 (L) 11.7 - 15.7 g/dL Final     O POS  Rubella Immune    A/P: 32 year old  PPD# 1 s/p  with second degree laceration and postpartum hemorrhage (QBL ~ 1071 ml)    1.  Routine post-partum cares.  2.  Gestational thrombocytopenia - platelet level 88 this AM. Discussed with on-call physician, Dr. Flores. Plan to repeat CBC with platelets tomorrow.   3.  Acute blood loss anemia - hgb 9.4 this AM, plan PO iron today and continue at discharge  4.  Hx of PPH (QBL ~1071 ml) and increased flow of blood this morning around 0230. She received IV TXA and is now on PO methergine q6hrs for 24 hrs. Fundus firm, light flow. No large clots.  5.  Continue Tylenol and ibuprofen for analgesia  6.  Anticipate discharge home tomorrow on PPD# 2      SAPNA Allen CNM  2023  8:23 AM

## 2023-02-17 NOTE — ANESTHESIA POSTPROCEDURE EVALUATION
Patient: Sujatha Vasquez    Procedure: * No procedures listed *       Anesthesia Type:  Epidural    Note:  Disposition: Inpatient   Postop Pain Control:    PONV:    Neuro/Psych:    Airway/Respiratory:    CV/Hemodynamics:    Other NRE:    DID A NON-ROUTINE EVENT OCCUR?     Event details/Postop Comments:  The patient was satisfied with her labor epidural and had no complaints. She is able to ambulate and denies urinary or bowel complaints.            Last vitals:  Vitals:    02/17/23 0429 02/17/23 0717 02/17/23 1600   BP: 97/65 94/56 116/72   Pulse: 70 81 86   Resp:  16 18   Temp:   37  C (98.6  F)   SpO2:          Electronically Signed By: Rainer Bob MD  February 17, 2023  5:32 PM

## 2023-02-17 NOTE — PLAN OF CARE
VSS, BP 97/65. Denies feeling dizzy or lightheaded. Light flow with fundal checks, firm and midline. Txa given and first dose methergine q6hrs for 24hrs given. hx gestational thrombocytonenia, platelets 88- CBC in AM. Pain well controlled with Tylenol and ibuprofen. Up with stand-by in room. Working on breastfeeding , pumping after feeding and supplementing with EBM/DM.  Continue to monitor and notify MD as needed.

## 2023-02-18 VITALS
DIASTOLIC BLOOD PRESSURE: 67 MMHG | SYSTOLIC BLOOD PRESSURE: 107 MMHG | HEART RATE: 70 BPM | RESPIRATION RATE: 16 BRPM | TEMPERATURE: 98.1 F | OXYGEN SATURATION: 100 %

## 2023-02-18 LAB
ERYTHROCYTE [DISTWIDTH] IN BLOOD BY AUTOMATED COUNT: 13.5 % (ref 10–15)
HCT VFR BLD AUTO: 30.7 % (ref 35–47)
HGB BLD-MCNC: 10.2 G/DL (ref 11.7–15.7)
MCH RBC QN AUTO: 31.7 PG (ref 26.5–33)
MCHC RBC AUTO-ENTMCNC: 33.2 G/DL (ref 31.5–36.5)
MCV RBC AUTO: 95 FL (ref 78–100)
PLATELET # BLD AUTO: 105 10E3/UL (ref 150–450)
RBC # BLD AUTO: 3.22 10E6/UL (ref 3.8–5.2)
WBC # BLD AUTO: 7.8 10E3/UL (ref 4–11)

## 2023-02-18 PROCEDURE — 36415 COLL VENOUS BLD VENIPUNCTURE: CPT

## 2023-02-18 PROCEDURE — 250N000013 HC RX MED GY IP 250 OP 250 PS 637: Performed by: OBSTETRICS & GYNECOLOGY

## 2023-02-18 PROCEDURE — 85027 COMPLETE CBC AUTOMATED: CPT

## 2023-02-18 PROCEDURE — 250N000013 HC RX MED GY IP 250 OP 250 PS 637

## 2023-02-18 PROCEDURE — 250N000013 HC RX MED GY IP 250 OP 250 PS 637: Performed by: ADVANCED PRACTICE MIDWIFE

## 2023-02-18 RX ORDER — DOCUSATE SODIUM 100 MG/1
100 CAPSULE, LIQUID FILLED ORAL DAILY
Qty: 30 CAPSULE | Refills: 0 | Status: SHIPPED | OUTPATIENT
Start: 2023-02-19

## 2023-02-18 RX ORDER — IBUPROFEN 800 MG/1
800 TABLET, FILM COATED ORAL EVERY 6 HOURS PRN
Qty: 60 TABLET | Refills: 0 | Status: SHIPPED | OUTPATIENT
Start: 2023-02-18

## 2023-02-18 RX ORDER — ACETAMINOPHEN 325 MG/1
650 TABLET ORAL EVERY 4 HOURS PRN
Qty: 60 TABLET | Refills: 0 | Status: SHIPPED | OUTPATIENT
Start: 2023-02-18

## 2023-02-18 RX ADMIN — IBUPROFEN 800 MG: 400 TABLET ORAL at 00:00

## 2023-02-18 RX ADMIN — IBUPROFEN 800 MG: 400 TABLET ORAL at 06:51

## 2023-02-18 RX ADMIN — ACETAMINOPHEN 650 MG: 325 TABLET, FILM COATED ORAL at 00:00

## 2023-02-18 RX ADMIN — IBUPROFEN 800 MG: 400 TABLET ORAL at 13:48

## 2023-02-18 RX ADMIN — DOCUSATE SODIUM 100 MG: 100 CAPSULE, LIQUID FILLED ORAL at 08:34

## 2023-02-18 RX ADMIN — ACETAMINOPHEN 650 MG: 325 TABLET, FILM COATED ORAL at 13:48

## 2023-02-18 RX ADMIN — ACETAMINOPHEN 650 MG: 325 TABLET, FILM COATED ORAL at 06:51

## 2023-02-18 RX ADMIN — METHYLERGONOVINE MALEATE 200 MCG: 0.2 TABLET ORAL at 02:31

## 2023-02-18 RX ADMIN — FERROUS SULFATE TAB 325 MG (65 MG ELEMENTAL FE) 325 MG: 325 (65 FE) TAB at 08:34

## 2023-02-18 ASSESSMENT — ACTIVITIES OF DAILY LIVING (ADL)
ADLS_ACUITY_SCORE: 18

## 2023-02-18 NOTE — PLAN OF CARE
VSS. Fundus firm, midline U/1 with scant flow.  Pain well controlled with tylenol/ibuprofen.  Up independently in room.  Working on breastfeeding  and  cares pumping after feedings. Progressing per care plan. Continue to monitor and notify MD as needed.

## 2023-02-18 NOTE — PROGRESS NOTES
OB Post-partum Note  PPD# 2    S:  Patient doing well.  Pain controlled with PO meds.  Voiding independently.  Bleeding light-moderate.  Breast feeding and supplementing, baby struggling with blood sugars. Baby will stay at least one more night pending blood sugar management and fetal ECHO.    O:  /64   Pulse 80   Temp 98.5  F (36.9  C) (Oral)   Resp 16   LMP 2022   SpO2 100%   Breastfeeding Unknown   Gen- A&O, NAD  Abd- Non-tender, fundus firm at umbilicus  Ext- non-tender, mild edema    Hemoglobin   Date Value Ref Range Status   2023 10.2 (L) 11.7 - 15.7 g/dL Final     O POS  Rubella Immune    A/P: 32 year old  PPD# 2 s/p , PPH (QBL 1071), gestational thrombocytopenia.    1.  Routine post-partum cares.  2.  Gestational thrombocytopenia - plt this , stable vaginal bleeding. Discussed with sinai Dominguez to remove IV and discharge, will follow platelets outpatient in follow up.  3.   Postpartum hemorrhage - Hgb 10.2 this AM. No longer taking PO methergine, bleeding stable.   4.  Discharge plan reviewed with sinai Negron to discharge today and bed & board with baby. Ok to remove PIV at earliest convenience so patient can comfortably shower and attend to  cares.   5.  Follow up in clinic at 2 and 6 weeks postpartum for routine checks and platelet monitoring.     SAPNA Graves CNM  2023  8:51 AM

## 2023-02-18 NOTE — PROGRESS NOTES
Vital signs stable. Postpartum assessment WDL. Pain controlled with ibu and tylenol. Patient voiding without difficulty. Breastfeeding on cue with no assist. Patient and infant bonding well. Will continue with current plan of care. CBC and labs improving. Pt doesn't feel dizzy and lightheaded anymore. Iron PO started today.Keep IV per CNM order.

## 2023-02-18 NOTE — PLAN OF CARE
Patient ambulating independently and voiding spontaneously without difficulty. Appears to be bonding well with baby. Spouse appears supportive. Continuing to work on breastfeeding. Continue with current plan of care.

## 2023-02-18 NOTE — PLAN OF CARE
D: VSS, assessments WDL.   I: Pt. received complete discharge paperwork and home medications as filled by discharge pharmacy.  Pt. was given times of last dose for all discharge medications in writing on discharge medication sheets.  Discharge teaching included home medication, pain management, activity restrictions, postpartum cares, and signs and symptoms of infection.    A: Discharge outcomes on care plan met.  Mother states understanding and comfort with self care and follow up care.   P: Pt. Discharged without baby. Baby in NICU. Pt. was accompanied by , nurse, and left with personal belongings. Pt. to follow up with OB provider per discharge instructions.  Pt. had no further questions at the time of discharge and no unmet needs were identified.

## 2023-02-20 LAB
PATH REPORT.COMMENTS IMP SPEC: NORMAL
PATH REPORT.COMMENTS IMP SPEC: NORMAL
PATH REPORT.FINAL DX SPEC: NORMAL
PATH REPORT.GROSS SPEC: NORMAL
PATH REPORT.MICROSCOPIC SPEC OTHER STN: NORMAL
PATH REPORT.RELEVANT HX SPEC: NORMAL
PHOTO IMAGE: NORMAL

## 2023-02-21 ENCOUNTER — LACTATION ENCOUNTER (OUTPATIENT)
Age: 33
End: 2023-02-21

## 2023-02-21 NOTE — LACTATION NOTE
This note was copied from a baby's chart.  Routine visit with Mother, Father, and baby girl.  Requested to visit room for questions on pumping.  LC assessed Mother's nipples as she was concerned about blisters.  No blisters present upon assessment but a few blebs noted where milk is working through the nipple on her left side.  LC assessed flange size of the pump as Mother was concerned with being too small; LC educated Mother on different sizes and that it is possible she may have to go up and then back down.  She is currently using 24 m flange that looked like appropriate size.  Mother encouraged to keep the 27 m flange close if she feels like she needs to go up.  LC educated Mother on how to know if the flange if too small and what to watch for.  LC stated to Mother that as of right now going up to the 27m flange may be too big.  Mother has her Troup pump at bedside.  LC encouraged her to read the directions of the pump and to go on the Troup web site and view videos of proper use.  LC discussed nipple shield use, length and frequency of pumping, milk production, and general breast feeding information.  Emotional support and encouragement provided to Mother.  Will follow as needed.

## 2023-03-04 ENCOUNTER — LACTATION ENCOUNTER (OUTPATIENT)
Age: 33
End: 2023-03-04

## 2023-03-04 NOTE — LACTATION NOTE
"This note was copied from a baby's chart.  Family discharging home today. Sujatha had questions about her supply. Infant 16 days old and Sujatha has been pumping since delivery. Sujatha responded very well to pumping and quickly built up a nice milk supply. Sujatha is getting about 7 ounces per pumping session. However, infant is breast feeding most of her feedings and only have 2-3 bottles per day. Infant transferring from the breast adequately, reason for bottles is that infant needs some higher calorie feedings (fortified in the bottle).     So Sujatha is still pumping for those feedings, hence her milk supply is unable to regulate to infant's direct needs. Delmys breasts are waking her up at night d/t being full. RANDAL talked through some stategies with Sujatha on how to slowly trend her supply down in the hopes of not creating a \"plugged duct\" scenario.    Discussed:  1) When breast feeding--Don't pump after breastfeeding sessions. Infant only needs to empty one breast for her full feeding.  2) When it's a bottle session, Sujatha should full pump session on one breast and on second breast, slowly begin to decrease pumping minutes (like every 3-4 days). Eventually, Sujatha will be able only pump one breast with pumping sessions to mimic infant's feeding pattern.  3) Instead of using the full suction Haakaa, switch to Haakaa Kristi's (they don't have the suction component).    Since family discharging, gave outpatient follow-up options.    Ashley Hall RN IBCLC   "

## 2023-03-30 ENCOUNTER — LAB REQUISITION (OUTPATIENT)
Dept: LAB | Facility: CLINIC | Age: 33
End: 2023-03-30

## 2023-03-30 ENCOUNTER — LAB REQUISITION (OUTPATIENT)
Dept: LAB | Facility: CLINIC | Age: 33
End: 2023-03-30
Payer: COMMERCIAL

## 2023-03-30 DIAGNOSIS — Z86.2 PERSONAL HISTORY OF DISEASES OF THE BLOOD AND BLOOD-FORMING ORGANS AND CERTAIN DISORDERS INVOLVING THE IMMUNE MECHANISM: ICD-10-CM

## 2023-03-30 DIAGNOSIS — Z01.419 ENCOUNTER FOR GYNECOLOGICAL EXAMINATION (GENERAL) (ROUTINE) WITHOUT ABNORMAL FINDINGS: ICD-10-CM

## 2023-03-30 LAB
ERYTHROCYTE [DISTWIDTH] IN BLOOD BY AUTOMATED COUNT: 12 % (ref 10–15)
HCT VFR BLD AUTO: 39.5 % (ref 35–47)
HGB BLD-MCNC: 12.7 G/DL (ref 11.7–15.7)
MCH RBC QN AUTO: 30.8 PG (ref 26.5–33)
MCHC RBC AUTO-ENTMCNC: 32.2 G/DL (ref 31.5–36.5)
MCV RBC AUTO: 96 FL (ref 78–100)
PLATELET # BLD AUTO: 170 10E3/UL (ref 150–450)
RBC # BLD AUTO: 4.12 10E6/UL (ref 3.8–5.2)
WBC # BLD AUTO: 6 10E3/UL (ref 4–11)

## 2023-03-30 PROCEDURE — 85027 COMPLETE CBC AUTOMATED: CPT | Performed by: MIDWIFE

## 2023-03-30 PROCEDURE — 87624 HPV HI-RISK TYP POOLED RSLT: CPT | Mod: ORL | Performed by: MIDWIFE

## 2023-03-30 PROCEDURE — G0145 SCR C/V CYTO,THINLAYER,RESCR: HCPCS | Mod: ORL | Performed by: MIDWIFE

## 2023-04-05 LAB
BKR LAB AP GYN ADEQUACY: NORMAL
BKR LAB AP GYN INTERPRETATION: NORMAL
BKR LAB AP HPV REFLEX: NORMAL
BKR LAB AP LMP: NORMAL
BKR LAB AP PREVIOUS ABNL DX: NORMAL
BKR LAB AP PREVIOUS ABNORMAL: NORMAL
PATH REPORT.COMMENTS IMP SPEC: NORMAL
PATH REPORT.COMMENTS IMP SPEC: NORMAL
PATH REPORT.RELEVANT HX SPEC: NORMAL

## 2023-04-06 LAB
HUMAN PAPILLOMA VIRUS 16 DNA: NEGATIVE
HUMAN PAPILLOMA VIRUS 18 DNA: NEGATIVE
HUMAN PAPILLOMA VIRUS FINAL DIAGNOSIS: NORMAL
HUMAN PAPILLOMA VIRUS OTHER HR: NEGATIVE

## 2023-07-22 ENCOUNTER — HOSPITAL ENCOUNTER (OUTPATIENT)
Dept: MRI IMAGING | Facility: CLINIC | Age: 33
Discharge: HOME | End: 2023-07-22
Attending: COLON & RECTAL SURGERY | Admitting: COLON & RECTAL SURGERY
Payer: COMMERCIAL

## 2023-07-22 DIAGNOSIS — K60.30 ANAL FISTULA: ICD-10-CM

## 2023-07-22 PROCEDURE — 74183 MRI ABD W/O CNTR FLWD CNTR: CPT

## 2023-07-22 PROCEDURE — A9585 GADOBUTROL INJECTION: HCPCS | Performed by: COLON & RECTAL SURGERY

## 2023-07-22 PROCEDURE — 255N000002 HC RX 255 OP 636: Performed by: COLON & RECTAL SURGERY

## 2023-07-22 RX ORDER — GADOBUTROL 604.72 MG/ML
7 INJECTION INTRAVENOUS ONCE
Status: COMPLETED | OUTPATIENT
Start: 2023-07-22 | End: 2023-07-22

## 2023-07-22 RX ADMIN — GADOBUTROL 7 ML: 604.72 INJECTION INTRAVENOUS at 08:14

## 2023-08-16 ENCOUNTER — LAB REQUISITION (OUTPATIENT)
Dept: LAB | Facility: CLINIC | Age: 33
End: 2023-08-16

## 2023-08-16 ENCOUNTER — LAB REQUISITION (OUTPATIENT)
Dept: LAB | Facility: CLINIC | Age: 33
End: 2023-08-16
Payer: COMMERCIAL

## 2023-08-16 DIAGNOSIS — N89.8 OTHER SPECIFIED NONINFLAMMATORY DISORDERS OF VAGINA: ICD-10-CM

## 2023-08-16 DIAGNOSIS — Z83.49 FAMILY HISTORY OF OTHER ENDOCRINE, NUTRITIONAL AND METABOLIC DISEASES: ICD-10-CM

## 2023-08-16 LAB — TSH SERPL DL<=0.005 MIU/L-ACNC: 1.67 UIU/ML (ref 0.3–4.2)

## 2023-08-16 PROCEDURE — 84443 ASSAY THYROID STIM HORMONE: CPT | Mod: ORL | Performed by: OBSTETRICS & GYNECOLOGY

## 2023-08-16 PROCEDURE — 88305 TISSUE EXAM BY PATHOLOGIST: CPT | Performed by: PATHOLOGY

## 2023-10-21 ENCOUNTER — HEALTH MAINTENANCE LETTER (OUTPATIENT)
Age: 33
End: 2023-10-21

## 2024-02-13 ENCOUNTER — LAB REQUISITION (OUTPATIENT)
Dept: LAB | Facility: CLINIC | Age: 34
End: 2024-02-13
Payer: COMMERCIAL

## 2024-02-13 ENCOUNTER — HOSPITAL ENCOUNTER (OUTPATIENT)
Facility: CLINIC | Age: 34
Discharge: HOME | End: 2024-02-13
Admitting: NURSE PRACTITIONER
Payer: COMMERCIAL

## 2024-02-13 DIAGNOSIS — Z31.9 ENCOUNTER FOR PROCREATIVE MANAGEMENT, UNSPECIFIED: ICD-10-CM

## 2024-02-13 LAB
HCG INTACT+B SERPL-ACNC: <1 MIU/ML
TSH SERPL DL<=0.005 MIU/L-ACNC: 2.45 UIU/ML (ref 0.3–4.2)

## 2024-02-13 PROCEDURE — 84443 ASSAY THYROID STIM HORMONE: CPT | Mod: ORL | Performed by: NURSE PRACTITIONER

## 2024-02-13 PROCEDURE — 84702 CHORIONIC GONADOTROPIN TEST: CPT | Mod: ORL | Performed by: NURSE PRACTITIONER

## 2024-05-28 ENCOUNTER — TRANSFERRED RECORDS (OUTPATIENT)
Dept: HEALTH INFORMATION MANAGEMENT | Facility: CLINIC | Age: 34
End: 2024-05-28

## 2024-05-28 ENCOUNTER — LAB REQUISITION (OUTPATIENT)
Dept: LAB | Facility: CLINIC | Age: 34
End: 2024-05-28
Payer: COMMERCIAL

## 2024-05-28 ENCOUNTER — MEDICAL CORRESPONDENCE (OUTPATIENT)
Dept: HEALTH INFORMATION MANAGEMENT | Facility: CLINIC | Age: 34
End: 2024-05-28

## 2024-05-28 DIAGNOSIS — Z36.9 ENCOUNTER FOR ANTENATAL SCREENING, UNSPECIFIED: ICD-10-CM

## 2024-05-28 DIAGNOSIS — Z87.59 PERSONAL HISTORY OF OTHER COMPLICATIONS OF PREGNANCY, CHILDBIRTH AND THE PUERPERIUM: ICD-10-CM

## 2024-05-28 LAB
BASOPHILS # BLD AUTO: 0 10E3/UL (ref 0–0.2)
BASOPHILS NFR BLD AUTO: 1 %
EOSINOPHIL # BLD AUTO: 0.1 10E3/UL (ref 0–0.7)
EOSINOPHIL NFR BLD AUTO: 1 %
ERYTHROCYTE [DISTWIDTH] IN BLOOD BY AUTOMATED COUNT: 12.9 % (ref 10–15)
HCT VFR BLD AUTO: 37.5 % (ref 35–47)
HGB BLD-MCNC: 12.7 G/DL (ref 11.7–15.7)
HIV 1+2 AB+HIV1 P24 AG SERPL QL IA: NONREACTIVE
IMM GRANULOCYTES # BLD: 0 10E3/UL
IMM GRANULOCYTES NFR BLD: 0 %
LYMPHOCYTES # BLD AUTO: 1.8 10E3/UL (ref 0.8–5.3)
LYMPHOCYTES NFR BLD AUTO: 26 %
MCH RBC QN AUTO: 31.3 PG (ref 26.5–33)
MCHC RBC AUTO-ENTMCNC: 33.9 G/DL (ref 31.5–36.5)
MCV RBC AUTO: 92 FL (ref 78–100)
MONOCYTES # BLD AUTO: 0.4 10E3/UL (ref 0–1.3)
MONOCYTES NFR BLD AUTO: 6 %
NEUTROPHILS # BLD AUTO: 4.4 10E3/UL (ref 1.6–8.3)
NEUTROPHILS NFR BLD AUTO: 66 %
NRBC # BLD AUTO: 0 10E3/UL
NRBC BLD AUTO-RTO: 0 /100
PLATELET # BLD AUTO: 179 10E3/UL (ref 150–450)
RBC # BLD AUTO: 4.06 10E6/UL (ref 3.8–5.2)
WBC # BLD AUTO: 6.7 10E3/UL (ref 4–11)

## 2024-05-28 PROCEDURE — 86803 HEPATITIS C AB TEST: CPT | Mod: ORL | Performed by: ADVANCED PRACTICE MIDWIFE

## 2024-05-28 PROCEDURE — 82728 ASSAY OF FERRITIN: CPT | Mod: ORL | Performed by: ADVANCED PRACTICE MIDWIFE

## 2024-05-28 PROCEDURE — 80081 OBSTETRIC PANEL INC HIV TSTG: CPT | Mod: ORL | Performed by: ADVANCED PRACTICE MIDWIFE

## 2024-05-28 PROCEDURE — 87086 URINE CULTURE/COLONY COUNT: CPT | Mod: ORL | Performed by: ADVANCED PRACTICE MIDWIFE

## 2024-05-29 LAB
ABO/RH(D): NORMAL
ANTIBODY SCREEN: NEGATIVE
FERRITIN SERPL-MCNC: 37 NG/ML (ref 6–175)
HBV SURFACE AG SERPL QL IA: NONREACTIVE
HCV AB SERPL QL IA: NONREACTIVE
RPR SER QL: NONREACTIVE
RUBV IGG SERPL QL IA: 3.32 INDEX
RUBV IGG SERPL QL IA: POSITIVE
SPECIMEN EXPIRATION DATE: NORMAL

## 2024-05-30 LAB
BACTERIA UR CULT: ABNORMAL
BACTERIA UR CULT: ABNORMAL

## 2024-05-31 ENCOUNTER — TRANSCRIBE ORDERS (OUTPATIENT)
Dept: MATERNAL FETAL MEDICINE | Facility: CLINIC | Age: 34
End: 2024-05-31
Payer: COMMERCIAL

## 2024-05-31 DIAGNOSIS — O26.90 PREGNANCY RELATED CONDITION, ANTEPARTUM: Primary | ICD-10-CM

## 2024-07-18 ENCOUNTER — PRE VISIT (OUTPATIENT)
Dept: MATERNAL FETAL MEDICINE | Facility: CLINIC | Age: 34
End: 2024-07-18
Payer: COMMERCIAL

## 2024-07-22 ENCOUNTER — HOSPITAL ENCOUNTER (OUTPATIENT)
Dept: ULTRASOUND IMAGING | Facility: CLINIC | Age: 34
Discharge: HOME | End: 2024-07-22
Attending: ADVANCED PRACTICE MIDWIFE
Payer: COMMERCIAL

## 2024-07-22 ENCOUNTER — TRANSFERRED RECORDS (OUTPATIENT)
Dept: HEALTH INFORMATION MANAGEMENT | Facility: CLINIC | Age: 34
End: 2024-07-22

## 2024-07-22 ENCOUNTER — LAB (OUTPATIENT)
Dept: LAB | Facility: CLINIC | Age: 34
End: 2024-07-22
Attending: ADVANCED PRACTICE MIDWIFE
Payer: COMMERCIAL

## 2024-07-22 ENCOUNTER — OFFICE VISIT (OUTPATIENT)
Dept: MATERNAL FETAL MEDICINE | Facility: CLINIC | Age: 34
End: 2024-07-22
Attending: ADVANCED PRACTICE MIDWIFE
Payer: COMMERCIAL

## 2024-07-22 ENCOUNTER — MEDICAL CORRESPONDENCE (OUTPATIENT)
Dept: HEALTH INFORMATION MANAGEMENT | Facility: CLINIC | Age: 34
End: 2024-07-22

## 2024-07-22 DIAGNOSIS — Z31.5 ENCOUNTER FOR PROCREATIVE GENETIC COUNSELING AND TESTING: ICD-10-CM

## 2024-07-22 DIAGNOSIS — O26.90 PREGNANCY RELATED CONDITION, ANTEPARTUM: ICD-10-CM

## 2024-07-22 DIAGNOSIS — Z31.430 ENCOUNTER OF FEMALE FOR TESTING FOR GENETIC DISEASE CARRIER STATUS FOR PROCREATIVE MANAGEMENT: ICD-10-CM

## 2024-07-22 DIAGNOSIS — Z82.79 FAMILY HISTORY OF DOWN SYNDROME: ICD-10-CM

## 2024-07-22 DIAGNOSIS — O09.899 HISTORY OF PRETERM DELIVERY, CURRENTLY PREGNANT: ICD-10-CM

## 2024-07-22 DIAGNOSIS — O35.10X0 FAMILY HISTORIC RISK OF CHROMOSOMAL ABNORMALITY IN FETUS, ANTEPARTUM, SINGLE OR UNSPECIFIED FETUS: Primary | ICD-10-CM

## 2024-07-22 DIAGNOSIS — Z31.5 ENCOUNTER FOR PROCREATIVE GENETIC COUNSELING AND TESTING: Primary | ICD-10-CM

## 2024-07-22 PROCEDURE — 76805 OB US >/= 14 WKS SNGL FETUS: CPT

## 2024-07-22 PROCEDURE — 76817 TRANSVAGINAL US OBSTETRIC: CPT | Mod: 26 | Performed by: OBSTETRICS & GYNECOLOGY

## 2024-07-22 PROCEDURE — 76805 OB US >/= 14 WKS SNGL FETUS: CPT | Mod: 26 | Performed by: OBSTETRICS & GYNECOLOGY

## 2024-07-22 PROCEDURE — 36415 COLL VENOUS BLD VENIPUNCTURE: CPT

## 2024-07-22 PROCEDURE — 96040 HC GENETIC COUNSELING, EACH 30 MINUTES: CPT | Performed by: GENETIC COUNSELOR, MS

## 2024-07-22 NOTE — PROGRESS NOTES
"[facility]  Genetic Counseling Consult    Patient:  Sujatha Vasquez  Preferred Name: Sujatha YOB: 1990   Date of Service:  24   MRN: [id number]    Sujatha was seen at the [facility] for genetic consultation. The indication for genetic counseling is desire to discuss options for genetic screening and diagnostics and family history concern. The patient was unaccompanied to this visit.     The session was conducted in English.      IMPRESSION/ PLAN   1. Sujatha had genetic screening earlier in this pregnancy. Their non-invasive prenatal test was screen negative or low risk for screened conditions     2. During today's [facility] visit, Sujatha had a blood draw for carrier screening. Results are expected in 2-3 weeks. The patient will be called with results and if they do not answer they requested a vague message with callback information. Patient was informed that results will be available in [place].     3. Since the patient chose aneuploidy screening via NIPT, quad screen is NOT recommended in the second trimester. If the patient desires screening for open neural tube defects, maternal serum AFP only is recommended, ideally between 16-18 weeks gestation.    4. Sujatha had a early second trimester anatomy ultrasound today. Please see the ultrasound report for further details.    5. Further recommendations include a fetal anatomy ultrasound around 18-20 weeks, which will most likely be completed with their primary OB provider.    PREGNANCY HISTORY   /Parity:       Sujatha's pregnancy history is significant for:   A daughter, \"Magen\" with Trisomy 21 (T21). 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 We reviewed that 95% of cases of T21 occur by a random nondisjunction event, while " "the remaining 5% can be attributed to a familial translocation that is inherited through the egg or sperm. Considering Sujatha's age and her history of having a child with nondisjunction T21, her chance to have another child with T21 is slightly increased from her age-related risk. However, a negative NIPT for T21, T18, T13, and SCA decrease the chance of her pregnancy being affected by any of the listed conditions to a 0.01% chance.     CURRENT PREGNANCY   Current Age: 34 year old   Age at Delivery: 34 year old  VIOLETA: 1/7/2025, by Last Menstrual Period                                   Gestational Age: 15w6d  This pregnancy is a single gestation.   This pregnancy was conceived spontaneously.  Sujatha does not report any complications and/or exposure concerns in this pregnancy.     MEDICAL HISTORY   Sujatha s reported medical history is not expected to impact pregnancy management or risks to fetal development.       FAMILY HISTORY   A three-generation pedigree was not obtained today due to our focus on other topics. The family history was reported by Sujatha.    The following significant updated were reported today:   Sujatha previously saw cancer genetic counseling in 2021 and received a hereditary cancer gene panel. 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   Sujatha's paternal aunt has a diagnosis of Prader-Willi syndrome (PWS). During our discussion, we reviewed that PWS is genetic condition caused by changes in the paternally inherited chromosome 15q11.2. This condition can lead to hypotonia and failure to thrive earlier on in life and short stature, intellectual differences, and central obesity later in life. Because of the nature of inheritance and de asad rate of 60-70% for this condition, it is unlikely that it " would be of a concern for the patient's current pregnancy.     Otherwise, the reported family history is unremarkable for multiple miscarriages, stillbirths, birth defects, intellectual disabilities, known genetic conditions, and consanguinity.       RISK ASSESSMENT FOR INHERITED CONDITIONS AND CARRIER SCREENING OPTIONS   Expanded carrier screening is available to screen for autosomal recessive conditions and X-linked conditions in a large list of genes. Carrier screening does not test the pregnancy but gives a risk assessment for the pregnancy and future pregnancies to have the condition. Expanded carrier screening is designed to identify carrier status for conditions that are primarily childhood or adolescent onset. Expanded carrier screening does not evaluate for adult-onset conditions such as hereditary cancer syndromes, dementia/ Alzheimer's disease, or cardiovascular disease risk factors. Additionally, expanded carrier screening is not comprehensive for all known genetic diseases or inherited conditions. Carrier screening does not test for all genetic and health conditions or risk factors.     Autosomal recessive conditions happen when a mutation has been inherited from the egg and sperm and include conditions like cystic fibrosis, thalassemia, hearing loss, spinal muscular atrophy, and more. We reviewed that when both biological parents carry a harmful genetic change in a gene associated with autosomal recessive inheritance, each of their pregnancies has a 1 in 4 (25%) chance to be affected by that condition. X-linked conditions happen when a mutation has been inherited from the egg and include conditions like fragile X syndrome.With x-linked conditions, the specific risk generally depends on the chromosomal sex of the fetus, with XY individuals (generally male) being most severely affected.     Fertile screening was not reviewed due to focus on other topics.  About [place]  Scre[place]    The patient does  NOT have a family history of known inherited conditions. This does NOT mean the patient and/or their partner is not a carrier of a condition. Approximately 90% of couples at an increased reproductive risk for an inherited condition have no family history of that condition.     The patient has not had carrier screening previously.     The patient elected to pursue carrier screening today. The screening will include 267 conditions through [employer] See below for the more detailed information we discussed.     We discussed carrier screening can be done before or during apregnancy. The purpose of carrier screening is providing an individual or couple with a personalized risk assessment for genetic conditions. This is accomplished by screening an individual to determine if they are a carrierfor a genetic condition. For most conditions, both parents must be a carrier of the condition for the pregnancy to be at an increased risk. For other conditions that are X-linked, there is an increased risk when a female(mother) is a carrier and the concerns are greater if she passes that condition to a son.     Carrier screening is an option and a personal decision to pursue. If an individual or couple is interested or wishes to pursue carrier screening the following is discussed:  For most genetic conditions, carriers are healthy individuals. For autosomal recessive conditions (ex cystic fibrosis, sicklecell anemia, etc), individuals have two copies of each gene. Carrier individuals have a mutation in one copy. For X-linked conditions, females have two copies of each gene and are considered carriers if one copy has amutation. Males only have one copy of an X-linked gene, therefore, if that one copy has a mutation they are affected by the condition, rather than only being a carrier    For select conditions, carriers can have symptoms, however, the chance is variable. Examples of these conditions  include:  Femalepremutation carriers of fragile X syndrome can have symptoms in adulthood including premature ovarian failure and ataxia. If a female passes the mutation to a son they are at a high risk for fragile X syndrome, which is themost common genetic cause for autism spectrum disorder  Female or male carriers of Gaucher disease can have an increased chance for developing Parkinson's disease. Gaucher disease is a severe metabolic disorder.     If both parents are carriers of an autosomal recessive condition, there are three possible outcomes for each pregnancy/child: 25% unaffected, 50% carrier, and 25%affected. The only method to determine the outcome or diagnosis the condition in a pregnancy is an invasive testing option such as an amniocentesis. Some genetic conditions will have ultrasound findings during the pregnancybut many do not. Some couples will choose the diagnostic testing to plan for delivery or choose termination of an affected pregnancy. Other couples will choose to test for the condition after delivery. While these conditionscannot be cured or treated during the pregnancy it may guide management recommendations (ultrasounds) for pregnancy as well as delivery and infant care.     Sujatha wished to pursue carrier screening. The following was discussed as part of informed consent in addition to the above information:    Carrier screening is not meant to diagnose the patient with a condition, and generally carriers are asymptomatic. However, certain genes may confer increased risks for various health concerns in carriers such as fragile X syndrome, Duchene muscular dystrophy, and Gaucher disease among others.     We discussed that expanded carrier screening is designed to identify carrier status for conditions that are primarily childhood or adolescent onset. Expanded carrier screening does not evaluate for adult-onset conditions such as hereditary cancer syndromes, dementia/ Alzheimer's disease,  or cardiovascular disease risk factors. Additionally, expanded carrier screening is not comprehensive for all known genetic diseases or inherited conditions. This is a screening test, and residual carrier status risk figures will be provided to the patient after results become available.  We discussed there are limitations such as current technology and rare chance of a false positive or negative.       Results are typically available in 10-21 days. We will call her with the results and the report will eventually be available via [employer]'s patient portal.     Recommendations will be made for partner testing, if the patient is found to be a carrier for any autosomal recessive conditions. MFM will facilitate in screening for the partner.     There are implications for family members. If an individual is a carrier of a condition there is a chance for relatives to also be a carrier. This may be helpful information to disclose to family (siblings, cousins) so they may choose if they want to pursue carriers screening. In addition, if only one parent is found to be a carrier, there is a 50% chance for each child to be a carrier. This may be helpful information for the patient's children when they start a family.      RISK ASSESSMENT FOR CHROMOSOME CONDITIONS   We explained that the risk for fetal chromosome abnormalities increases with maternal age. We discussed specific features of common chromosome abnormalities, including Down syndrome, trisomy 13, trisomy 18, and sex chromosome trisomies.    At age 35 at midtrimester, the risk to have a baby with Down syndrome is 1 in 421.   At age 35 at midtrimester, the risk to have a baby with any chromosome abnormality is 1 in 217.    Sujatha had genetic screening earlier in this pregnancy. Their non-invasive prenatal test was screen negative or low risk for screened conditions     Non-invasive prenatal testing (NIPT) results  Maternal plasma cell-free DNA testing  Screens for  fetal trisomy 21, trisomy 13, trisomy 18, and sex chromosome aneuploidy  First trimester ultrasound with nuchal translucency and nasal bone assessment was within normal limits.  Sujatha had a Panorama NIPT through [employer] earlier in pregnancy; we reviewed the results today, which are low risk.  The NIPT did include sex chromosome aneuploidies and the result was low risk. The report included predicted sex but the patient does not want to know this information.  Given the accuracy of this test, these results greatly decrease the chance for certain fetal chromosome abnormalities  We discussed the limitations of normal NIPT results  Maternal serum AFP only to screen for open neural tube defects (after 15 weeks) was not performed in this pregnancy, to our knowledge.     GENETIC TESTING OPTIONS   Genetic testing during a pregnancy includes screening and diagnostic procedures.      Screening tests are non-invasive which means no risk to the pregnancy and includes ultrasounds and blood work. The benefits and limitations of screening were reviewed. Screening tests provide a risk assessment (chance) specific to the pregnancy for certain fetal chromosome abnormalities but cannot definitively diagnose or exclude a fetal chromosome abnormality. Follow-up genetic counseling and consideration of diagnostic testing is recommended with any abnormal screening result. Diagnostic testing during a pregnancy is more certain and can test for more conditions. However, the tests do have a risk of miscarriage that requires careful consideration. These tests can detect fetal chromosome abnormalities with greater than 99% certainty. Results can be compromised by maternal cell contamination or mosaicism and are limited by the resolution of current genetic testing technology.     There is no screening or diagnostic test that detects all forms of birth defects or intellectual disability.     We discussed the following screening  options:     Non-invasive prenatal testing (NIPT)  Also called cell-free DNA screening because it detects chromosomes from the placenta in the pregnant person's blood  Can be done any time after 10 weeks gestation  Standard recommendation for NIPT screens for trisomy 21, trisomy 18, trisomy 13, with the option of adding sex chromosome aneuploidies, without or without predicted sex  Cannot screen for open neural tube defects, maternal serum AFP after 15 weeks is recommended  New NIPT options include screening for other trisomies, microdeletion syndromes, and in some cases fetal blood antigens. Guidelines do not recommend these conditions are included in standard screening. These options have limitations and should be discussed with a genetic counselor.   However, current (2023) ACMG guidelines do recommend that screening for one microdeletion syndrome, called 22q11.2 deletion syndrome be offered to all pregnant patients. 22q11.2 deletion syndrome has an estimated prevalence of 1 in 990 to 1 in 2148 (0.05-0.1%). Risk is not thought to increase with maternal age. Clinical features are variable but include risks for congenital heart defects, cleft palate, developmental delays, immune system deficiencies, and hearing loss. Approximately 90% of cases are de asad (a sporadic new change in a pregnancy). Cell-free DNA screening for 22q11.2 deletion syndrome is available (Expanded NIPS through [employer]). We discussed the limitations of cell-free DNA screening in detecting microdeletions and the possiblity of false positives and false negatives. Expanded NIPS also allows for reflex to include other microdeletion conditions and rare autosomal trisomies if an indication would arise later in the pregnancy. The patient declined microdeletion syndrome screening.    We discussed the following ultrasound options:    Nuchal translucency (NT) ultrasound  Ultrasound between [id number] that includes nuchal translucency  measurement and nasal bone assessments  Nuchal translucency refers to the space at the back of the neck where fluid builds up. All babies at this stage have fluid and there is only concern if there is too much fluid  Nasal bone refers to the small bone in the nose. There is concern for conditions like Down syndrome if the bone cannot be seen at all  This ultrasound can be done as part of first trimester screening, at the same time as another screen (NIPT), at the same time as a CVS, or if the patients does not want genetic screening.  Markers on ultrasound detects about 70% of pregnancies with aneuploidy  Abnormalities on NT ultrasound can also increase the risk for a birth defect, like a heart defect    Comprehensive level II ultrasound (Fetal Anatomy Ultrasound)  Ultrasound done between 18-20 weeks gestation  Screens for major birth defects and markers for aneuploidy (like trisomy 21 and trisomy 18)  Includes looking at the fetus/baby's growth, heart, organs (stomach, kidneys), placenta, and amniotic fluid    We discussed the following diagnostic options:     Chorionic villus sampling (CVS)  Invasive diagnostic procedure done between 10w0d and 13w6d  The procedure collects a small sample from the placenta for the purpose of chromosomal testing and/or other genetic testing  Diagnostic result; more than 99% sensitivity for fetal chromosome abnormalities  Cannot screen for open neural tube defects, maternal serum AFP after 15 weeks is recommended    Amniocentesis  Invasive diagnostic procedure done after 15 weeks gestation  The procedure collects a small sample of amniotic fluid for the purpose of chromosomal testing and/or other genetic testing  Diagnostic result; more than 99% sensitivity for fetal chromosome abnormalities  Testing for AFP in the amniotic fluid can test for open neural tube defects    It was a pleasure to be involved with Sujatha ibarra care. Face-to-face time of the meeting was 30 minutes.    Heather  Jake, Genetic Counseling Student    Mehrdad Velez, GC, MS, [facility]  Board Certified and [place] Licensed Genetic Counselor   [facility]  Maternal Fetal Medicine  Office: [phone]  [facility]: [phone]   Fax: [phone]  [facility]

## 2024-07-31 ENCOUNTER — TELEPHONE (OUTPATIENT)
Dept: MATERNAL FETAL MEDICINE | Facility: CLINIC | Age: 34
End: 2024-07-31
Payer: COMMERCIAL

## 2024-07-31 LAB — SCANNED LAB RESULT: ABNORMAL

## 2024-08-01 NOTE — TELEPHONE ENCOUNTER
7/31/2024    Called Sujatha to discuss her expanded carrier screening results.  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  This carrier status is not expected to impact Sujatha's health in any way, however, if her  Wil were to be a carrier of the same condition, her ongoing pregnancy would be at 25% risk for being affected.     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 This leads to damage of the nervous system.   There is a severe form and a later-onset form. The severe form presents during the first few days of life, resulting in vomiting, poor feeding, lethargy, and poorly-controlled breathing. If untreated, the symptoms lead to seizures, coma, and eventually death.   The later-onset form of the condition presents following an acute infection or stress. Symptoms can include learning disabilities, behavioral challenges, liver disease, skin lesions, brittle hair, and high blood pressure.   The condition is treated with diet and can reduce the severity of symptoms, but not cure the condition.    Sujatha's results are negative for the other 266 assessed conditions, indicating low risk to pregnancy for these other disorders.  We discussed that the general population frequency for carriers of argininosuccinic aciduria is <1%, but that carrier screening for Wil to clarify their risks would be appropriate.  The couple is going to be at [facility] next week Tuesday for an appointment for their daughter, and Sujatha asked if it would be possible to coordinate testing for Wil ahead of Tuesday so he could have his blood drawn while they are all on site.  Sujatha gave verbal permission for genetic counseling to contact her  Wil Vasquez, [birth year], at [phone] to discuss her results as needed and begin  coordinating his testing.      Mehrdad Velez MS, [facility]  Licensed Genetic Counselor  Phone: [phone]  Pager: [phone]

## 2024-09-04 ENCOUNTER — TRANSFERRED RECORDS (OUTPATIENT)
Dept: HEALTH INFORMATION MANAGEMENT | Facility: CLINIC | Age: 34
End: 2024-09-04
Payer: COMMERCIAL

## 2024-09-05 ENCOUNTER — DOCUMENTATION ONLY (OUTPATIENT)
Dept: MATERNAL FETAL MEDICINE | Facility: CLINIC | Age: 34
End: 2024-09-05
Payer: COMMERCIAL

## 2024-09-05 NOTE — PROGRESS NOTES
Date: 2024     I called Sujatha's partner to review the results of his carrier screening for the [employer] comprehensive carrier screening which assessed 267 genes. He did not answer, so I left a voicemail. The couple was not found to be carriers for any of the same conditions. They are not expected to have symptoms of the conditions they carry. They were encouraged to share results with family members for their own consideration of carrier screening. Her partner was found to be a carrier for Cystic Fibrosis and Biotinidase Deficiency.    CFTR-related disorders (CFTR c.1040G>A(R347H)):  CFTR-related conditions encompass a spectrum of disorders that typically impact the respiratory and/or digestive systems, and cause male infertility.   The most severe CFTR condition is cystic fibrosis (CF). CF is typically a childhood-onset condition in which abnormally thick mucus production can cause respiratory infections, lung disease, gastrointestinal problems, and pancreatic insufficiency.   Since Sujatha was NOT identified to be a carrier of this condition, the residual risk is reduced.     Biotinidase deficiency (BTD: c.1330G>C(D444H)):  This condition is caused by an inability to break down the B vitamin known as biotin.   If untreated, symptoms include neurological concerns and skin findings and can be very severe. There is a milder form of the condition called partial biotinidase deficeincy. Individuals with the mild form hay be asymptomatic or may intermittent symptoms including hypotonia, rashes, and hair loss.   Biotinidase deficiency is treatable with biotin supplementation. Biotinidase deficiency is on [place] Screening.   Since Sujatha was NOT identified to be a carrier of this condition, the residual reproductive risk is reduced.    Results are available in [employer] for review.    Casie Blake, MS, [facility]  Licensed Genetic Counselor  [facility]  Pager: [phone]  Office:  [phone]

## 2024-09-09 ENCOUNTER — TRANSCRIBE ORDERS (OUTPATIENT)
Dept: MATERNAL FETAL MEDICINE | Facility: CLINIC | Age: 34
End: 2024-09-09
Payer: COMMERCIAL

## 2024-09-09 ENCOUNTER — MEDICAL CORRESPONDENCE (OUTPATIENT)
Dept: HEALTH INFORMATION MANAGEMENT | Facility: CLINIC | Age: 34
End: 2024-09-09
Payer: COMMERCIAL

## 2024-09-09 DIAGNOSIS — O26.90 PREGNANCY RELATED CONDITION, ANTEPARTUM: Primary | ICD-10-CM

## 2024-09-18 ENCOUNTER — CARE COORDINATION (OUTPATIENT)
Dept: MATERNAL FETAL MEDICINE | Facility: CLINIC | Age: 34
End: 2024-09-18
Payer: COMMERCIAL

## 2024-09-18 DIAGNOSIS — Z91.89 BREASTFEEDING PROBLEM: Primary | ICD-10-CM

## 2024-09-18 NOTE — PROGRESS NOTES
Writer called and spoke with lactation specialist from [facility]. Lactation says that saliva exposure via breastfeeding of toddler is very low risk. Lactation states that toddler receiving chemo may self wean around 18-24 months due to mouth sores from the chemo.  Patient should discuss this with child's oncologist as well.

## 2024-09-25 ENCOUNTER — OFFICE VISIT (OUTPATIENT)
Dept: MATERNAL FETAL MEDICINE | Facility: CLINIC | Age: 34
End: 2024-09-25
Attending: STUDENT IN AN ORGANIZED HEALTH CARE EDUCATION/TRAINING PROGRAM
Payer: COMMERCIAL

## 2024-09-25 ENCOUNTER — HOSPITAL ENCOUNTER (OUTPATIENT)
Dept: ULTRASOUND IMAGING | Facility: CLINIC | Age: 34
Discharge: HOME | End: 2024-09-25
Attending: STUDENT IN AN ORGANIZED HEALTH CARE EDUCATION/TRAINING PROGRAM
Payer: COMMERCIAL

## 2024-09-25 DIAGNOSIS — Z36.89 ENCOUNTER FOR FETAL ANATOMIC SURVEY: Primary | ICD-10-CM

## 2024-09-25 DIAGNOSIS — Z91.89 BREASTFEEDING PROBLEM: ICD-10-CM

## 2024-09-25 PROCEDURE — 76811 OB US DETAILED SNGL FETUS: CPT

## 2024-09-25 PROCEDURE — 99215 OFFICE O/P EST HI 40 MIN: CPT | Mod: 25 | Performed by: STUDENT IN AN ORGANIZED HEALTH CARE EDUCATION/TRAINING PROGRAM

## 2024-09-25 PROCEDURE — 99417 PROLNG OP E/M EACH 15 MIN: CPT | Mod: 25 | Performed by: STUDENT IN AN ORGANIZED HEALTH CARE EDUCATION/TRAINING PROGRAM

## 2024-09-25 PROCEDURE — G0463 HOSPITAL OUTPT CLINIC VISIT: HCPCS | Mod: 25 | Performed by: STUDENT IN AN ORGANIZED HEALTH CARE EDUCATION/TRAINING PROGRAM

## 2024-09-25 PROCEDURE — 76811 OB US DETAILED SNGL FETUS: CPT | Mod: 26 | Performed by: STUDENT IN AN ORGANIZED HEALTH CARE EDUCATION/TRAINING PROGRAM

## 2024-09-25 NOTE — NURSING NOTE
Patient presents to [facility] for L2/MFM cosnult at 25w1d due to child with leukemia, chemo treatment - patient exposure. Positive fetal movement. Denies LOF, vaginal bleeding or cramping/contractions. SBAR given to HUGO MD, see their note in Epic.

## 2024-09-27 NOTE — PROGRESS NOTES
The patient was seen for an ultrasound in the [facility] today.  For a detailed report of the ultrasound examination, please see the ultrasound report which can be found under the imaging tab.     If you have questions regarding today's evaluation or if we can be of further service, please contact the Maternal-Fetal Medicine Center.    Tisha Gonzales MD  Maternal Fetal Medicine

## 2024-10-07 ENCOUNTER — LAB REQUISITION (OUTPATIENT)
Dept: LAB | Facility: CLINIC | Age: 34
End: 2024-10-07
Payer: COMMERCIAL

## 2024-10-07 DIAGNOSIS — D69.6 THROMBOCYTOPENIA, UNSPECIFIED (H): ICD-10-CM

## 2024-10-07 LAB
ERYTHROCYTE [DISTWIDTH] IN BLOOD BY AUTOMATED COUNT: 13.2 % (ref 10–15)
HCT VFR BLD AUTO: 36.8 % (ref 35–47)
HGB BLD-MCNC: 11.9 G/DL (ref 11.7–15.7)
MCH RBC QN AUTO: 31.6 PG (ref 26.5–33)
MCHC RBC AUTO-ENTMCNC: 32.3 G/DL (ref 31.5–36.5)
MCV RBC AUTO: 98 FL (ref 78–100)
PLATELET # BLD AUTO: 115 10E3/UL (ref 150–450)
RBC # BLD AUTO: 3.77 10E6/UL (ref 3.8–5.2)
WBC # BLD AUTO: 7.8 10E3/UL (ref 4–11)

## 2024-10-07 PROCEDURE — 85027 COMPLETE CBC AUTOMATED: CPT | Mod: ORL

## 2024-10-22 ENCOUNTER — LAB REQUISITION (OUTPATIENT)
Dept: LAB | Facility: CLINIC | Age: 34
End: 2024-10-22
Payer: COMMERCIAL

## 2024-10-22 DIAGNOSIS — Z36.89 ENCOUNTER FOR OTHER SPECIFIED ANTENATAL SCREENING: ICD-10-CM

## 2024-10-22 DIAGNOSIS — D69.6 THROMBOCYTOPENIA, UNSPECIFIED (H): ICD-10-CM

## 2024-10-22 LAB
ERYTHROCYTE [DISTWIDTH] IN BLOOD BY AUTOMATED COUNT: 13 % (ref 10–15)
HCT VFR BLD AUTO: 36.1 % (ref 35–47)
HGB BLD-MCNC: 11.9 G/DL (ref 11.7–15.7)
MCH RBC QN AUTO: 32 PG (ref 26.5–33)
MCHC RBC AUTO-ENTMCNC: 33 G/DL (ref 31.5–36.5)
MCV RBC AUTO: 97 FL (ref 78–100)
PLATELET # BLD AUTO: 103 10E3/UL (ref 150–450)
RBC # BLD AUTO: 3.72 10E6/UL (ref 3.8–5.2)
T PALLIDUM AB SER QL: NONREACTIVE
WBC # BLD AUTO: 10.1 10E3/UL (ref 4–11)

## 2024-10-22 PROCEDURE — 85027 COMPLETE CBC AUTOMATED: CPT | Mod: ORL | Performed by: ADVANCED PRACTICE MIDWIFE

## 2024-10-22 PROCEDURE — 86780 TREPONEMA PALLIDUM: CPT | Mod: ORL | Performed by: ADVANCED PRACTICE MIDWIFE

## 2024-10-28 ENCOUNTER — TELEPHONE (OUTPATIENT)
Dept: MIDWIFE SERVICES | Facility: CLINIC | Age: 34
End: 2024-10-28
Payer: COMMERCIAL

## 2024-10-28 NOTE — TELEPHONE ENCOUNTER
[facility] Call Center    Phone Message    May a detailed message be left on voicemail: yes     Reason for Call: Other: . Pt is a ERNESTO from [facility] in [facility] [id number], pt will be faxing her med recs to the clinic prior to her nurse call.    Of note, pt wanted to see Shaquille Davis, but was ok seeing Sharon for her first visit, pt would like a call back to schedule all future visits with Shaquille since call center only schedules initial appts, and after initial appts then follow ups can be scheduled, please call pt for follow up,s thank you!    Action Taken: Message routed to:  Other: obgyn    Travel Screening: Not Applicable     Date of Service:

## 2024-10-28 NOTE — TELEPHONE ENCOUNTER
Records pulled into chart from care everywhere.  Labs already reflexed into prenatal care charting.  Pregnancy episodes started and current.    Patient has ERNESTO intake on 11/6    Amanda Monroe RN

## 2024-11-06 ENCOUNTER — VIRTUAL VISIT (OUTPATIENT)
Dept: OBGYN | Facility: CLINIC | Age: 34
End: 2024-11-06
Attending: ADVANCED PRACTICE MIDWIFE
Payer: COMMERCIAL

## 2024-11-06 VITALS — BODY MASS INDEX: 25.34 KG/M2 | HEIGHT: 66 IN

## 2024-11-06 DIAGNOSIS — Z34.91 ENCOUNTER FOR SUPERVISION OF NORMAL PREGNANCY IN FIRST TRIMESTER: ICD-10-CM

## 2024-11-06 DIAGNOSIS — Z23 NEED FOR DIPHTHERIA-TETANUS-PERTUSSIS (TDAP) VACCINE: Primary | ICD-10-CM

## 2024-11-06 PROBLEM — J15.3 PNEUMONIA DUE TO GROUP B STREPTOCOCCUS (H): Status: RESOLVED | Noted: 2024-05-30 | Resolved: 2024-11-06

## 2024-11-06 PROBLEM — M79.18 MYALGIA, PELVIC REGION AND THIGH: Status: ACTIVE | Noted: 2023-07-31

## 2024-11-06 PROBLEM — O99.119 BENIGN GESTATIONAL THROMBOCYTOPENIA (H): Status: ACTIVE | Noted: 2024-11-06

## 2024-11-06 PROBLEM — O60.00 PRETERM LABOR: Status: RESOLVED | Noted: 2023-02-16 | Resolved: 2024-11-06

## 2024-11-06 PROBLEM — R00.2 PALPITATIONS: Status: ACTIVE | Noted: 2024-11-06

## 2024-11-06 PROBLEM — Z84.81 FAMILY HISTORY OF BRCA GENE MUTATION: Status: ACTIVE | Noted: 2021-06-14

## 2024-11-06 PROBLEM — J15.3 PNEUMONIA DUE TO GROUP B STREPTOCOCCUS (H): Status: ACTIVE | Noted: 2024-05-30

## 2024-11-06 PROBLEM — Z87.59 HISTORY OF POSTPARTUM HEMORRHAGE: Status: ACTIVE | Noted: 2024-11-06

## 2024-11-06 PROBLEM — Z86.2 HISTORY OF THROMBOCYTOPENIA: Status: ACTIVE | Noted: 2024-11-06

## 2024-11-06 PROBLEM — D69.6 BENIGN GESTATIONAL THROMBOCYTOPENIA (H): Status: ACTIVE | Noted: 2024-11-06

## 2024-11-06 PROBLEM — M62.81 MUSCLE WEAKNESS: Status: ACTIVE | Noted: 2023-10-03

## 2024-11-06 PROBLEM — M62.81 MUSCLE WEAKNESS: Status: RESOLVED | Noted: 2023-10-03 | Resolved: 2024-11-06

## 2024-11-06 PROCEDURE — 99207 PR NO CHARGE NURSE ONLY: CPT | Mod: 93

## 2024-11-06 NOTE — PROGRESS NOTES
Important Information for Provider:     New ob transfer from [facility], she is having her records faxed over. Records from [facility] are in her chart. Recent ultrasound 2024 at 30 weeks. NIPT was normal  History of  delivery 2023 at 35 weeks 5 days at [facility]  Patient denies any problems at this time. NOB with CNM 2024   History of thrombocytopenia        Prenatal OB Questionnaire  Patient supplied answers from flow sheet for:  Prenatal OB Questionnaire.  Past Medical History  Have you ever recieved care for your mental health? : No  Have you ever been in a major accident or suffered serious trauma?: No  Within the last year, has anyone hit, slapped, kicked or otherwise hurt you?: No  In the last year, has anyone forced you to have sex when you didn't want to?: No    Past Medical History 2   Have you ever received a blood transfusion?: No  Would you accept a blood transfusion if was medically recommended?: Yes  Does anyone in your home smoke?: No   Is your blood type Rh negative?: No  Have you ever ?: (!) Yes  Have you been hospitalized for a nonsurgical reason excluding normal delivery?:Yes  Have you ever had an abnormal pap smear?: No    Past Medical History (Continued)  Do you have a history of abnormalities of the uterus?: No  Did your mother take SIMONA or any other hormones when she was pregnant with you?: No  Do you have any other problems we have not asked about which you feel may be important to this pregnancy?: No                     Allergies as of 2024:    Allergies as of 2024 - Reviewed 2023   Allergen Reaction Noted    Seasonale  2023    Nickel Rash 2023

## 2024-11-08 ENCOUNTER — TRANSFERRED RECORDS (OUTPATIENT)
Dept: HEALTH INFORMATION MANAGEMENT | Facility: CLINIC | Age: 34
End: 2024-11-08
Payer: COMMERCIAL

## 2024-11-19 ENCOUNTER — PRENATAL OFFICE VISIT (OUTPATIENT)
Dept: MIDWIFE SERVICES | Facility: CLINIC | Age: 34
End: 2024-11-19
Attending: ADVANCED PRACTICE MIDWIFE
Payer: COMMERCIAL

## 2024-11-19 VITALS
SYSTOLIC BLOOD PRESSURE: 109 MMHG | DIASTOLIC BLOOD PRESSURE: 79 MMHG | HEART RATE: 99 BPM | WEIGHT: 186 LBS | BODY MASS INDEX: 30.02 KG/M2

## 2024-11-19 DIAGNOSIS — D69.3 IMMUNE THROMBOCYTOPENIA AFFECTING PREGNANCY IN THIRD TRIMESTER (H): ICD-10-CM

## 2024-11-19 DIAGNOSIS — O99.113 IMMUNE THROMBOCYTOPENIA AFFECTING PREGNANCY IN THIRD TRIMESTER (H): ICD-10-CM

## 2024-11-19 DIAGNOSIS — Z34.83 ENCOUNTER FOR SUPERVISION OF OTHER NORMAL PREGNANCY, THIRD TRIMESTER: Primary | ICD-10-CM

## 2024-11-19 LAB
ERYTHROCYTE [DISTWIDTH] IN BLOOD BY AUTOMATED COUNT: 12.9 % (ref 10–15)
HCT VFR BLD AUTO: 35.6 % (ref 35–47)
HGB BLD-MCNC: 12.2 G/DL (ref 11.7–15.7)
MCH RBC QN AUTO: 32.2 PG (ref 26.5–33)
MCHC RBC AUTO-ENTMCNC: 34.3 G/DL (ref 31.5–36.5)
MCV RBC AUTO: 94 FL (ref 78–100)
PLATELET # BLD AUTO: 94 10E3/UL (ref 150–450)
RBC # BLD AUTO: 3.79 10E6/UL (ref 3.8–5.2)
WBC # BLD AUTO: 8 10E3/UL (ref 4–11)

## 2024-11-19 PROCEDURE — 99203 OFFICE O/P NEW LOW 30 MIN: CPT | Performed by: ADVANCED PRACTICE MIDWIFE

## 2024-11-19 PROCEDURE — 36415 COLL VENOUS BLD VENIPUNCTURE: CPT | Performed by: ADVANCED PRACTICE MIDWIFE

## 2024-11-19 PROCEDURE — 85027 COMPLETE CBC AUTOMATED: CPT | Performed by: ADVANCED PRACTICE MIDWIFE

## 2024-11-19 ASSESSMENT — EDINBURGH POSTNATAL DEPRESSION SCALE (EPDS)
THE THOUGHT OF HARMING MYSELF HAS OCCURRED TO ME: NEVER
I HAVE FELT SCARED OR PANICKY FOR NO GOOD REASON: NO, NOT AT ALL
I HAVE BEEN SO UNHAPPY THAT I HAVE HAD DIFFICULTY SLEEPING: NOT AT ALL
I HAVE BLAMED MYSELF UNNECESSARILY WHEN THINGS WENT WRONG: NOT VERY OFTEN
I HAVE BEEN SO UNHAPPY THAT I HAVE HAD DIFFICULTY SLEEPING: NOT AT ALL
THE THOUGHT OF HARMING MYSELF HAS OCCURRED TO ME: NEVER
I HAVE BEEN ABLE TO LAUGH AND SEE THE FUNNY SIDE OF THINGS: AS MUCH AS I ALWAYS COULD
I HAVE FELT SCARED OR PANICKY FOR NO GOOD REASON: NO, NOT AT ALL
I HAVE FELT SAD OR MISERABLE: NO, NOT AT ALL
I HAVE BEEN ANXIOUS OR WORRIED FOR NO GOOD REASON: YES, SOMETIMES
I HAVE FELT SAD OR MISERABLE: NO, NOT AT ALL
I HAVE BEEN ANXIOUS OR WORRIED FOR NO GOOD REASON: YES, SOMETIMES
I HAVE BEEN SO UNHAPPY THAT I HAVE BEEN CRYING: NO, NEVER
I HAVE LOOKED FORWARD WITH ENJOYMENT TO THINGS: AS MUCH AS I EVER DID
THINGS HAVE BEEN GETTING ON TOP OF ME: NO, MOST OF THE TIME I HAVE COPED QUITE WELL
I HAVE BEEN SO UNHAPPY THAT I HAVE BEEN CRYING: NO, NEVER
I HAVE BLAMED MYSELF UNNECESSARILY WHEN THINGS WENT WRONG: NOT VERY OFTEN
I HAVE BEEN ABLE TO LAUGH AND SEE THE FUNNY SIDE OF THINGS: AS MUCH AS I ALWAYS COULD
THINGS HAVE BEEN GETTING ON TOP OF ME: NO, MOST OF THE TIME I HAVE COPED QUITE WELL
TOTAL SCORE: 4
I HAVE LOOKED FORWARD WITH ENJOYMENT TO THINGS: AS MUCH AS I EVER DID

## 2024-11-19 NOTE — PROGRESS NOTES
33w0d  Sujatha is feeling well. She says she is feeling her baby move regularly. She is transferring care to our clinic because she has a toddler with leukemia who is inpatient at [facility]. Discussed stress of having a toddler in the hospital while she is pregnant and concerns of exposure to her daughter's chemotherapy treatments as well as pre-term labor at 35 weeks with her previous pregnancy. Daughter was born unexpectedly with T 21. Has been seeing MFM related to chemo exposure concerns. Additional US ordered for 36 weeks to monitor fetal growth. CBC and platelets ordered today d/t thrombocytopenia. Last platelets was 104 on 10/22. Discussed risks of low platelets during pregnancy and labor as well as treatment options available while pregnant. Denies and regular contractions, vaginal bleeding, or LOF. RTC in 2 weeks.       Sujatha Vasquez is a 34 year old female    Pt presents to clinic today for a NOB visit. .   Patient's last menstrual period was 2024.. Estimated Date of Delivery: 2025 is calculated from lmp and verified with U/S.    She has not had bleeding since her LMP.   She has not had nausea. Weight loss has not occurred.   This was a planned pregnancy.   FOB is involved,  Wil.      Pt's hx of HSV is negative    ============================================  PERSONAL/SOCIAL HISTORY  Lives lives with their spouse.  Exercises no regular exercise program  Pt denies abuse and feels safe in her home and relationships.     REVIEW OF SYSTEMS  C: NEGATIVE for fever, chills, change in weight  I: NEGATIVE for worrisome rashes, moles or lesions  E/M: NEGATIVE for ear, mouth and throat problems  R: NEGATIVE for significant cough or SOB  CV: NEGATIVE for chest pain, palpitations or peripheral edema  GI: NEGATIVE for nausea, abdominal pain, heartburn, or change in bowel habits  : NEGATIVE for frequency, dysuria, or hematuria  PSYCHIATRIC:  NEGATIVE for depression, anxiety or other mental  health concerns    PHYSICAL EXAM:  /79   Pulse 99   Wt 84.4 kg (186 lb)   LMP 2024   BMI 30.02 kg/m    BMI- Body mass index is 30.02 kg/m ., RECOMMENDED WEIGHT GAIN: 25-35 lbs.  GENERAL:  Pleasant pregnant female, alert, cooperative and well groomed.  SKIN:  Warm and dry, without lesions or rashes  HEAD: Symmetrical features.  MOUTH:  Buccal mucosa pink, moist without lesions.  Teeth in good repair.    NECK:  Thyroid without enlargement and nodules.  Lymph nodes not palpable.   LUNGS:  Clear to auscultation.      HEART:  RRR without murmur.  ABDOMEN: Soft without masses , tenderness or organomegaly.  No CVA tenderness.  Uterus palpable at size equal to dates.  No scars noted..  MUSCULOSKELETAL:  Full range of motion  EXTREMITIES:  No edema. No significant varicosities.     PELVIC EXAM: deferred      GC/CHLAMYDIA CULTURE OBTAINED:NO     ASSESSMENT:  Intrauterine pregnancy at 33w0d weeks gestation.     (Z34.83) Encounter for supervision of other normal pregnancy, third trimester  (primary encounter diagnosis)  Comment:   Plan:       PLAN:  Oriented to CNM service.    Instructed on use of triage nurse line and contacting the on call CNM after hours for an urgent need such as fever, vagina bleeding, bladder or vaginal infection, rupture of membranes,  or term labor.      Chio Olivares, student DEBORAH    I was present with the CNM student who participated in the service and in the documentation of the services provided. I have verified the history and personally performed the physical exam and medical decision making, as documented by the student and edited by me.     Sharon Ren, MEENUM, APRN CNM CNM

## 2024-11-20 ENCOUNTER — TELEPHONE (OUTPATIENT)
Dept: MIDWIFE SERVICES | Facility: CLINIC | Age: 34
End: 2024-11-20
Payer: COMMERCIAL

## 2024-11-20 DIAGNOSIS — O99.113 IMMUNE THROMBOCYTOPENIA AFFECTING PREGNANCY IN THIRD TRIMESTER (H): Primary | ICD-10-CM

## 2024-11-20 DIAGNOSIS — D69.3 IMMUNE THROMBOCYTOPENIA AFFECTING PREGNANCY IN THIRD TRIMESTER (H): Primary | ICD-10-CM

## 2024-11-20 NOTE — TELEPHONE ENCOUNTER
Order entered for referral to heme/onc for platelets <100k. High risk for delivery soon. Currently 33w1d. Last delvery at 35 wks.     Sharon Ren, MEENUM, APRN CNM

## 2024-11-20 NOTE — TELEPHONE ENCOUNTER
Please call patient with platelets result of 94k and let her know that I made a referral to hemetology for advice about monitoring or treatment options. They may call her and set up an appointment. They may also just reach out to me with their recommendations. If I hear an appointment is not necessary I will let her know.   Sharon Ren, MEENUM, APRN CNM

## 2024-11-20 NOTE — TELEPHONE ENCOUNTER
Called patient with results and recommendations.  Patient expressed understanding and has no questions at this time.    Amanda Monroe RN

## 2024-11-21 PROBLEM — D69.3 IDIOPATHIC THROMBOCYTOPENIC PURPURA (H): Status: ACTIVE | Noted: 2024-11-21

## 2024-12-02 ENCOUNTER — PRENATAL OFFICE VISIT (OUTPATIENT)
Dept: MIDWIFE SERVICES | Facility: CLINIC | Age: 34
End: 2024-12-02
Payer: COMMERCIAL

## 2024-12-02 ENCOUNTER — MYC MEDICAL ADVICE (OUTPATIENT)
Dept: MIDWIFE SERVICES | Facility: CLINIC | Age: 34
End: 2024-12-02

## 2024-12-02 VITALS
DIASTOLIC BLOOD PRESSURE: 75 MMHG | HEART RATE: 88 BPM | WEIGHT: 189.2 LBS | BODY MASS INDEX: 30.54 KG/M2 | SYSTOLIC BLOOD PRESSURE: 119 MMHG | OXYGEN SATURATION: 100 %

## 2024-12-02 DIAGNOSIS — D69.3 IMMUNE THROMBOCYTOPENIA AFFECTING PREGNANCY IN THIRD TRIMESTER (H): ICD-10-CM

## 2024-12-02 DIAGNOSIS — O23.599 BACTERIAL VAGINOSIS IN PREGNANCY: ICD-10-CM

## 2024-12-02 DIAGNOSIS — Z34.83 ENCOUNTER FOR SUPERVISION OF OTHER NORMAL PREGNANCY, THIRD TRIMESTER: Primary | ICD-10-CM

## 2024-12-02 DIAGNOSIS — B37.31 YEAST INFECTION OF THE VAGINA: ICD-10-CM

## 2024-12-02 DIAGNOSIS — Z29.11 NEED FOR RSV IMMUNIZATION: ICD-10-CM

## 2024-12-02 DIAGNOSIS — N89.8 VAGINAL DISCHARGE: ICD-10-CM

## 2024-12-02 DIAGNOSIS — O99.113 IMMUNE THROMBOCYTOPENIA AFFECTING PREGNANCY IN THIRD TRIMESTER (H): ICD-10-CM

## 2024-12-02 DIAGNOSIS — B96.89 BACTERIAL VAGINOSIS IN PREGNANCY: ICD-10-CM

## 2024-12-02 DIAGNOSIS — K59.01 SLOW TRANSIT CONSTIPATION: ICD-10-CM

## 2024-12-02 LAB
APTT PPP: 28 SECONDS (ref 22–38)
BASOPHILS # BLD AUTO: 0 10E3/UL (ref 0–0.2)
BASOPHILS NFR BLD AUTO: 0 %
CLUE CELLS: PRESENT
EOSINOPHIL # BLD AUTO: 0 10E3/UL (ref 0–0.7)
EOSINOPHIL NFR BLD AUTO: 0 %
ERYTHROCYTE [DISTWIDTH] IN BLOOD BY AUTOMATED COUNT: 12.8 % (ref 10–15)
FIBRINOGEN PPP-MCNC: 314 MG/DL (ref 170–510)
HCT VFR BLD AUTO: 34.6 % (ref 35–47)
HGB BLD-MCNC: 12.4 G/DL (ref 11.7–15.7)
IMM GRANULOCYTES # BLD: 0.2 10E3/UL
IMM GRANULOCYTES NFR BLD: 2 %
INR PPP: 1.02 (ref 0.85–1.15)
LYMPHOCYTES # BLD AUTO: 1.7 10E3/UL (ref 0.8–5.3)
LYMPHOCYTES NFR BLD AUTO: 18 %
MCH RBC QN AUTO: 33.2 PG (ref 26.5–33)
MCHC RBC AUTO-ENTMCNC: 35.8 G/DL (ref 31.5–36.5)
MCV RBC AUTO: 93 FL (ref 78–100)
MONOCYTES # BLD AUTO: 0.7 10E3/UL (ref 0–1.3)
MONOCYTES NFR BLD AUTO: 8 %
NEUTROPHILS # BLD AUTO: 6.7 10E3/UL (ref 1.6–8.3)
NEUTROPHILS NFR BLD AUTO: 72 %
NRBC # BLD AUTO: 0 10E3/UL
NRBC BLD AUTO-RTO: 0 /100
PLATELET # BLD AUTO: 97 10E3/UL (ref 150–450)
RBC # BLD AUTO: 3.73 10E6/UL (ref 3.8–5.2)
RETICS # AUTO: 0.07 10E6/UL (ref 0.03–0.1)
RETICS/RBC NFR AUTO: 2 % (ref 0.5–2)
TRICHOMONAS, WET PREP: ABNORMAL
WBC # BLD AUTO: 9.3 10E3/UL (ref 4–11)
WBC'S/HIGH POWER FIELD, WET PREP: ABNORMAL
YEAST, WET PREP: PRESENT

## 2024-12-02 PROCEDURE — 85384 FIBRINOGEN ACTIVITY: CPT | Performed by: ADVANCED PRACTICE MIDWIFE

## 2024-12-02 PROCEDURE — 36415 COLL VENOUS BLD VENIPUNCTURE: CPT | Performed by: ADVANCED PRACTICE MIDWIFE

## 2024-12-02 PROCEDURE — 80076 HEPATIC FUNCTION PANEL: CPT | Performed by: ADVANCED PRACTICE MIDWIFE

## 2024-12-02 PROCEDURE — 85730 THROMBOPLASTIN TIME PARTIAL: CPT | Performed by: ADVANCED PRACTICE MIDWIFE

## 2024-12-02 PROCEDURE — 99207 PR PRENATAL VISIT: CPT | Performed by: ADVANCED PRACTICE MIDWIFE

## 2024-12-02 PROCEDURE — 85025 COMPLETE CBC W/AUTO DIFF WBC: CPT | Performed by: ADVANCED PRACTICE MIDWIFE

## 2024-12-02 PROCEDURE — 90678 RSV VACC PREF BIVALENT IM: CPT | Performed by: ADVANCED PRACTICE MIDWIFE

## 2024-12-02 PROCEDURE — 85045 AUTOMATED RETICULOCYTE COUNT: CPT | Performed by: ADVANCED PRACTICE MIDWIFE

## 2024-12-02 PROCEDURE — 87210 SMEAR WET MOUNT SALINE/INK: CPT | Performed by: ADVANCED PRACTICE MIDWIFE

## 2024-12-02 PROCEDURE — 99214 OFFICE O/P EST MOD 30 MIN: CPT | Mod: 25 | Performed by: ADVANCED PRACTICE MIDWIFE

## 2024-12-02 PROCEDURE — 90471 IMMUNIZATION ADMIN: CPT | Performed by: ADVANCED PRACTICE MIDWIFE

## 2024-12-02 PROCEDURE — 85060 BLOOD SMEAR INTERPRETATION: CPT | Performed by: PATHOLOGY

## 2024-12-02 PROCEDURE — 85610 PROTHROMBIN TIME: CPT | Performed by: ADVANCED PRACTICE MIDWIFE

## 2024-12-02 RX ORDER — POLYETHYLENE GLYCOL 3350 17 G/17G
1 POWDER, FOR SOLUTION ORAL DAILY
Qty: 510 G | Refills: 1 | Status: SHIPPED | OUTPATIENT
Start: 2024-12-02

## 2024-12-02 RX ORDER — MICONAZOLE NITRATE 2 %
1 CREAM WITH APPLICATOR VAGINAL AT BEDTIME
Qty: 45 G | Refills: 0 | Status: SHIPPED | OUTPATIENT
Start: 2024-12-02 | End: 2024-12-09

## 2024-12-02 RX ORDER — METRONIDAZOLE 500 MG/1
500 TABLET ORAL 2 TIMES DAILY
Qty: 14 TABLET | Refills: 0 | Status: SHIPPED | OUTPATIENT
Start: 2024-12-02 | End: 2024-12-09

## 2024-12-02 NOTE — PROGRESS NOTES
34w6d  Sujatha is here for prenatal care. RSV vaccine done today, discussed flu and covid for future visits. She is feeling well pregnancy wise. Sleeping on an air mattress at the hospital has been a challenge, feels it exacerbates her normal discomforts of pregnancy. 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 She has rectal bleeding with most BMs. Sent a can of miralax to pharmacy. She has used digoxin 2% cream, but do not recommend that in pregnancy. She has been having increased pelvic pressure. Endorses vaginal odor and discharge. Will collect wet prep today. Also getting hepatic panel, INR/PTT/fibrinogen and peripheral smear, as requested by hematology. Will recheck CBC with plts today as well.     Discussed weekly visits for remainder of her pregnancy. She is GBS positive, and having US next week for growth. Return to care 1 week.    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

## 2024-12-03 LAB
ALBUMIN SERPL BCG-MCNC: 3.4 G/DL (ref 3.5–5.2)
ALP SERPL-CCNC: 105 U/L (ref 40–150)
ALT SERPL W P-5'-P-CCNC: 11 U/L (ref 0–50)
AST SERPL W P-5'-P-CCNC: 13 U/L (ref 0–45)
BILIRUB DIRECT SERPL-MCNC: <0.2 MG/DL (ref 0–0.3)
BILIRUB SERPL-MCNC: 0.2 MG/DL
PATH REPORT.COMMENTS IMP SPEC: NORMAL
PATH REPORT.COMMENTS IMP SPEC: NORMAL
PATH REPORT.FINAL DX SPEC: NORMAL
PATH REPORT.MICROSCOPIC SPEC OTHER STN: NORMAL
PATH REPORT.MICROSCOPIC SPEC OTHER STN: NORMAL
PROT SERPL-MCNC: 6.1 G/DL (ref 6.4–8.3)

## 2024-12-03 NOTE — TELEPHONE ENCOUNTER
35w    Hoping to speak with provider about her wet prep results  Eda Redmond RN on 12/3/2024 at 8:48 AM

## 2024-12-03 NOTE — TELEPHONE ENCOUNTER
Call to pt, discussed BV and answered questions.  Pt stated she felt more reassured after our discussion.    Will start treatment today.    SAPNA VieraM

## 2024-12-12 ENCOUNTER — ANCILLARY PROCEDURE (OUTPATIENT)
Dept: ULTRASOUND IMAGING | Facility: CLINIC | Age: 34
End: 2024-12-12
Attending: ADVANCED PRACTICE MIDWIFE
Payer: COMMERCIAL

## 2024-12-12 ENCOUNTER — VIRTUAL VISIT (OUTPATIENT)
Dept: MIDWIFE SERVICES | Facility: CLINIC | Age: 34
End: 2024-12-12
Payer: COMMERCIAL

## 2024-12-12 ENCOUNTER — LAB (OUTPATIENT)
Dept: LAB | Facility: CLINIC | Age: 34
End: 2024-12-12
Payer: COMMERCIAL

## 2024-12-12 DIAGNOSIS — Z34.83 ENCOUNTER FOR SUPERVISION OF OTHER NORMAL PREGNANCY, THIRD TRIMESTER: ICD-10-CM

## 2024-12-12 DIAGNOSIS — O99.113 BENIGN GESTATIONAL THROMBOCYTOPENIA IN THIRD TRIMESTER (H): ICD-10-CM

## 2024-12-12 DIAGNOSIS — D69.6 BENIGN GESTATIONAL THROMBOCYTOPENIA IN THIRD TRIMESTER (H): ICD-10-CM

## 2024-12-12 DIAGNOSIS — Z34.83 ENCOUNTER FOR SUPERVISION OF OTHER NORMAL PREGNANCY IN THIRD TRIMESTER: Primary | ICD-10-CM

## 2024-12-12 DIAGNOSIS — N89.8 VAGINAL DISCHARGE: ICD-10-CM

## 2024-12-12 LAB
ERYTHROCYTE [DISTWIDTH] IN BLOOD BY AUTOMATED COUNT: 12.8 % (ref 10–15)
HCT VFR BLD AUTO: 38 % (ref 35–47)
HGB BLD-MCNC: 12.8 G/DL (ref 11.7–15.7)
MCH RBC QN AUTO: 31.8 PG (ref 26.5–33)
MCHC RBC AUTO-ENTMCNC: 33.7 G/DL (ref 31.5–36.5)
MCV RBC AUTO: 95 FL (ref 78–100)
PLATELET # BLD AUTO: 100 10E3/UL (ref 150–450)
RBC # BLD AUTO: 4.02 10E6/UL (ref 3.8–5.2)
WBC # BLD AUTO: 8.4 10E3/UL (ref 4–11)

## 2024-12-12 PROCEDURE — 76816 OB US FOLLOW-UP PER FETUS: CPT | Performed by: OBSTETRICS & GYNECOLOGY

## 2024-12-12 NOTE — PROGRESS NOTES
Sujatha is a 34 year old who is being evaluated via a billable telephone visit.    What phone number would you like to be contacted at? [phone]  How would you like to obtain your AVS? Manas  Originating Location (pt. Location): Other hospital with daughter    Distant Location (provider location):  On-site    Assessment & Plan   Problem List Items Addressed This Visit    None  Visit Diagnoses       Encounter for supervision of other normal pregnancy in third trimester    -  Primary    Vaginal discharge        Relevant Orders    Wet prep - Clinic Collect          Subjective   Sujatha is a 34 year old, presenting for the following health issues:  No chief complaint on file.    HPI   Sujatha Vasquez is a 34 year old  who presents today via telephone for follow up ultrasound. She was in clinic earlier but needed to leave and then we were unable to connect due to needing to attend a delivery so telephone visit was done. She was able to collect her labs, CBC for PLT and wet prep to see if her vaginal infection has resolved. She reports feeling less discharge and initially was feeling better but then feels like her the pressure increased again. She finished one of the medication last night and the other a few days ago. We discussed that sometimes the symptoms can lag a few days past when the medications are done. We discussed it would be appropriate to recheck again tomorrow or even Monday if they were unable to save the sample she collected today. She otherwise is doing well. Anoop has one more big round of chemo and then they are hoping to be discharged home, likely in a few more weeks. She is hoping to not go into labor until they are able to get home with Anoop. She had an ultrasound today that showed good growth, EFW 96.9% with AC 97.7%. Anoop was a big baby and they discussed potentially planning a  due to size. We discussed if the baby is estimated to be over 5000 grams we would offer a  primary  section. We also discussed risk with shoulder dystocia. She would like to see growth again considering she may not be able to get an epidural due to her PLT, she does not want to do a  but would consider it and feels like the growth would help her make that decision, ordered for 3 weeks from now. She pulled up her birth plan from [facility], answered a few questions for that. Will likely bring a birth plan next week to review. No other questions or concerns today.      Review of Systems  Constitutional, HEENT, cardiovascular, pulmonary, gi and gu systems are negative, except as otherwise noted.      Objective    Vitals:  No vitals were obtained today due to virtual visit.    Physical Exam   General: Alert and no distress //Respiratory: No audible wheeze, cough, or shortness of breath // Psychiatric:  Appropriate affect, tone, and pace of words    Phone call duration: 15 minutes  Signed Electronically by: SAPNA Peña CNM

## 2024-12-12 NOTE — PATIENT INSTRUCTIONS
"Week 37 of Your Pregnancy: Care Instructions    Most babies are born between 37 and 40 weeks.   This is a good time to pack a bag to take with you to the birth. Then it will be ready to go when you are.     Learn about breastfeeding.  For example, find out about ways to hold your baby to make breastfeeding easier. And think about learning how to pump and store milk.     Know that crying is normal.  It's common for babies to cry 1 to 3 hours a day. Some cry more, and some cry less.     Learn why babies cry.  They may be hungry; have gas; need a diaper change; or feel cold, warm, tired, lonely, or tense. Sometimes they cry for unknown reasons.     Think about what will help you stay calm when your baby cries.  Taking slow, deep breaths can help. So can taking a break. It's okay to put your baby somewhere safe (like their crib) and walk away for a few minutes.     Learn about safe sleep for your baby.  Always put your baby to sleep on their back. Place them alone in a crib or bassinet with a firm, flat surface. Keep soft items like stuffed animals out of the crib.     Learn what to expect with  poop.  Your baby will have their own bowel patterns. Some babies have several bowel movements a day. Some have fewer.     Know that  babies will often have loose, yellow bowel movements.  Formula-fed babies have more formed stools. If your baby's poop looks like pellets, your baby is constipated.   Follow-up care is a key part of your treatment and safety. Be sure to make and go to all appointments, and call your doctor if you are having problems. It's also a good idea to know your test results and keep a list of the medicines you take.  Where can you learn more?  Go to https://www.Ifinity.net/patiented  Enter N257 in the search box to learn more about \"Week 37 of Your Pregnancy: Care Instructions.\"  Current as of: July 10, 2023  Content Version: 2024 [facility] [employer].   Care instructions adapted " under license by your healthcare professional. If you have questions about a medical condition or this instruction, always ask your healthcare professional. [employer] disclaims any warranty or liability for your use of this information.    Week 38 of Your Pregnancy: Care Instructions  Believe it or not, your baby is almost here. You may notice how your baby responds to sounds, warmth, cold, and light. You may even know what kind of music your baby likes.    Even if you expect a vaginal birth, it's a good idea to learn about  section ().  is the delivery of a baby through a cut (incision) in your belly. It's a major surgery, so it has more risks than a vaginal birth.   During the first 2 weeks after the birth, limit visitors. Don't allow visitors who have colds or infections. Ask visitors to wash their hands before they touch your baby. And never let anyone smoke around your baby.     Know about unplanned C-sections.  Reasons for an unplanned  include labor that slows or stops, signs of distress in your baby, and high blood pressure or other problems for you.     Know about planned C-sections.  If your baby isn't in a head-down position for delivery (breech position), your doctor may plan a . Or you may have a planned  if you're having twins or more.     Get as much help as you can while you're in the hospital.  You can learn about feeding, diapering, and bathing your baby.     Talk to your doctor or midwife about how to care for the umbilical cord stump.  If your baby will be circumcised, also ask about how to care for that.     Ask friends or family for help, as you need it.  If you can, have another adult in your home for at least 2 or 3 days after the birth.     Try to nap when your baby naps.  This may be your best chance to get some sleep.     Watch for changes in your mental health.  For the first 1 to 2 weeks after birth, it's common to cry  "or feel sad or irritable. If these feelings last for more than 2 weeks, you may have postpartum depression. Ask your doctor for help. Postpartum depression can be treated.   Follow-up care is a key part of your treatment and safety. Be sure to make and go to all appointments, and call your doctor if you are having problems. It's also a good idea to know your test results and keep a list of the medicines you take.  Where can you learn more?  Go to https://www.Collarity.net/patiented  Enter B044 in the search box to learn more about \"Week 38 of Your Pregnancy: Care Instructions.\"  Current as of: July 10, 2023  Content Version: 2024 [employer].   Care instructions adapted under license by your healthcare professional. If you have questions about a medical condition or this instruction, always ask your healthcare professional. [employer] disclaims any warranty or liability for your use of this information.    Week 39 of Your Pregnancy: Care Instructions    Friend babies can look different than what you see in pictures or movies. Their heads can be a strange shape right after birth. And they may have swollen eyes and red marks on their faces.   You can still get pregnant even if you are breastfeeding. If you don't want to get pregnant, talk to your doctor about birth control.   Tips for week 39 of pregnancy  If you plan to breastfeed, get prepared.       Continue to eat healthy foods.  Keep taking your prenatal vitamins during breastfeeding if your doctor recommends it.  Talk to your doctor before taking any medicines or supplements.  Choose the right birth control for you.       Intrauterine devices (IUDs) are placed in the uterus. Sometimes the IUD can be placed right after giving birth. They work for years.  Hormonal implants are placed under the skin of the arm. They also work for years.  Depo-Provera is a shot. You get it every 3 months.  Birth control pills can be used. They're " "taken every day.  Tubal ligation (tying your tubes) and vasectomy are surgeries. They're permanent.  Diaphragms, spermicide, and condoms must be used each time you have sex. If you used a diaphragm before, you should get refitted after the baby is born.  A birth control patch or ring can be used. These just can't be started until several weeks after you give birth.  Follow-up care is a key part of your treatment and safety. Be sure to make and go to all appointments, and call your doctor if you are having problems. It's also a good idea to know your test results and keep a list of the medicines you take.  Where can you learn more?  Go to https://www.Waste2Tricity.net/patiented  Enter A811 in the search box to learn more about \"Week 39 of Your Pregnancy: Care Instructions.\"  Current as of: July 10, 2023  Content Version: 14.2     [employer].   Care instructions adapted under license by your healthcare professional. If you have questions about a medical condition or this instruction, always ask your healthcare professional. [employer] Inc[employer] disclaims any warranty or liability for your use of this information.    Weeks 40 to 41 of Your Pregnancy: Care Instructions  By week 40, you've reached your due date. Your baby could be coming any day. Most babies born after their due dates are healthy. But you may have tests to make sure everything is okay. If you feel stressed, you could try a meditation exercise, such as guided imagery. It may help you relax.    If you are past 41 weeks, your doctor may measure the amount of fluid that surrounds your baby. They may also test your baby's movement and heart rate.   If you don't start labor on your own by 41 or 42 weeks, your doctor may want to start (induce) labor. If there are other concerns, your doctor may talk to you about a .   To start (induce) your labor, your doctor may do any of these things.    Place a narrow tube with a small balloon on " "the end (balloon catheter) into your cervix.  The doctor inflates the balloon. This helps your cervix open (dilate).     Sweep the membranes (separate the lining of the amniotic sac from the uterus).  This helps the uterus make a chemical that can start contractions.     \"Break your water\" (rupture the amniotic sac).  This may be done if your cervix has started to open.     Use medicines.  They may be used to soften the cervix or start contractions.   How to do guided imagery    Close your eyes, and take a few deep breaths. Picture a peaceful setting, such as a beach or a meadow. Add a winding path. Follow the path until you feel more and more relaxed.   Take a few minutes to breathe slowly and feel the calm. When you are ready, slowly take yourself back to the present. Count to 3, and open your eyes.   Follow-up care is a key part of your treatment and safety. Be sure to make and go to all appointments, and call your doctor if you are having problems. It's also a good idea to know your test results and keep a list of the medicines you take.  Where can you learn more?  Go to https://www.Racktivity.net/patiented  Enter T922 in the search box to learn more about \"Weeks 40 to 41 of Your Pregnancy: Care Instructions.\"  Current as of: July 10, 2023  Content Version: 14.2 2024 [employer].   Care instructions adapted under license by your healthcare professional. If you have questions about a medical condition or this instruction, always ask your healthcare professional. Healthwise[employer] Incorporated disclaims any warranty or liability for your use of this information.    Labor Induction  What You Need to Know  What is labor induction?  In most cases, it is best to go into labor naturally. When labor does not start on its own, we may use medicine or other methods to start (induce) labor. This is called induction, which:  Helps the uterus contract  Thins, softens and opens the cervix (opening of the uterus)  When " "is induction used?  There are two types of induction.  Medical induction may be done to protect the health of the parent or baby if:  There are medical concerns for you or your baby.  You haven't had your baby by 41 weeks.  Induction for non-medical reasons may be done at 39 weeks or later if:  You live far from the hospital.  You've been having contractions for many days.  You've given birth quickly in the past.   We will not perform an induction for non-medical reasons before 39 weeks. Studies show that babies born before 39 weeks may struggle with breathing, feeding, sleeping and staying warm. They are more likely to have health problems and may need to stay in the hospital longer. If we start your labor for medical reasons, the benefits will outweigh these risks. We will talk to you about your risks, benefits and alternatives (other options) before we start your labor.  How is induction done?  We may start your labor by doing one or more of the following:  We may need to help your cervix soften and open (sometimes called \"ripening\") to prepare it for labor. There are two ways to do this:  Medicines--these may be in the form of pills that you swallow or medicines that are put into the vagina next to the cervix.  Mechanical--using either a single or double balloon. These are small balloons that are attached to tubes that go up inside the cervix. The balloons are then filled with water to put pressure on the cervix and help the cervix soften and open. Depending on the type of balloon used, you may be allowed to go home after it is placed.  After your cervix is ready:  We may give you medicine through an IV (a small tube placed in your vein). This medicine is called Pitocin. It helps the muscles of your uterus to contract.  We may make a small opening in your bag of water (the sac around your baby). This is called an amniotomy. Sometimes called \"breaking the water\". It may help your uterus contract and your cervix " open.  What might happen if my labor is induced?  Some of this depends on how your labor is started and how your body responds. Your labor may be more complicated. You and your baby may need more medical treatments. In general:  You may not go into labor right away. If so, we may send you home with follow-up instructions.  It will be important to monitor you and your baby during the induction.  You may not be able to move around during labor. You will have two belts with monitors attached to your belly. These allow the nurse to watch your contractions and your baby's heart rate.  Your labor may take longer than if you went into labor on your own. It might take more than one day.  If you need cervical ripening, it is important to know that it may be many hours (even days) until delivery happens.  Cervical ripening can be slow and require several doses before your body is ready to labor.  A long labor may increase the risk of infection in mother and baby.  Your labor may not progress as planned. This could lead to a  birth.  Can I plan the date of my delivery?  After 39 weeks, you may ask about planning your delivery date. This is only an option if your body--and your baby--are ready. To find out, we will check your cervix and your baby's heart rate.   If you are ready to be induced, we will give you a date and time to come to the hospital. If many patients are in labor that day, we may need to start your labor at another time.  If you are not ready, we will not start your labor. It will be safer for your baby to come on its own.  What else do I need to know?  Before you have an induction, make sure you understand the reasons, risks and benefits. Ask your doctor or nurse-midwife:  Why do I need to be induced?  What are the risks to my baby?  How will you start my labor?  How will you know if my baby is ready to be born?  How will you know if my body is ready to give birth?  Where can I get more  information?  To learn more about induction, you may visit these websites:  The American College of Nurse-Midwives:  www.mymidwife.org  The American College of Obstetricians and Gynecologists: www.acog.org  Childbirth Connection:  www.childbirthconnection.org  March of Dimes: www.marchofdimes.com  Association of Women's Health, Obstetrics, and  Nursing  www.idipko4yhk.org/go-the-full-40&#047;  For informational purposes only. Not to replace the advice of your health care provider. Copyright   2008 [employer]. All rights reserved. Clinically reviewed by the  System Operations Leadership team. Doctor Evidence 847108 - REV .

## 2024-12-14 ENCOUNTER — HEALTH MAINTENANCE LETTER (OUTPATIENT)
Age: 34
End: 2024-12-14

## 2024-12-16 ENCOUNTER — MYC MEDICAL ADVICE (OUTPATIENT)
Dept: MIDWIFE SERVICES | Facility: CLINIC | Age: 34
End: 2024-12-16

## 2024-12-16 ENCOUNTER — PRENATAL OFFICE VISIT (OUTPATIENT)
Dept: MIDWIFE SERVICES | Facility: CLINIC | Age: 34
End: 2024-12-16
Payer: COMMERCIAL

## 2024-12-16 ENCOUNTER — NURSE TRIAGE (OUTPATIENT)
Dept: NURSING | Facility: CLINIC | Age: 34
End: 2024-12-16

## 2024-12-16 ENCOUNTER — TELEPHONE (OUTPATIENT)
Dept: MIDWIFE SERVICES | Facility: CLINIC | Age: 34
End: 2024-12-16
Payer: COMMERCIAL

## 2024-12-16 ENCOUNTER — TELEPHONE (OUTPATIENT)
Dept: MIDWIFE SERVICES | Facility: CLINIC | Age: 34
End: 2024-12-16

## 2024-12-16 DIAGNOSIS — B96.89 BACTERIAL VAGINOSIS IN PREGNANCY: ICD-10-CM

## 2024-12-16 DIAGNOSIS — B37.31 YEAST INFECTION OF THE VAGINA: ICD-10-CM

## 2024-12-16 DIAGNOSIS — O23.599 BACTERIAL VAGINOSIS IN PREGNANCY: ICD-10-CM

## 2024-12-16 DIAGNOSIS — Z34.83 ENCOUNTER FOR SUPERVISION OF OTHER NORMAL PREGNANCY IN THIRD TRIMESTER: Primary | ICD-10-CM

## 2024-12-16 LAB
CLUE CELLS: PRESENT
TRICHOMONAS, WET PREP: ABNORMAL
WBC'S/HIGH POWER FIELD, WET PREP: ABNORMAL
YEAST, WET PREP: PRESENT

## 2024-12-16 PROCEDURE — 87210 SMEAR WET MOUNT SALINE/INK: CPT | Performed by: ADVANCED PRACTICE MIDWIFE

## 2024-12-16 RX ORDER — MICONAZOLE NITRATE 2 %
1 CREAM WITH APPLICATOR VAGINAL AT BEDTIME
Qty: 35 G | Refills: 0 | Status: SHIPPED | OUTPATIENT
Start: 2024-12-16

## 2024-12-16 RX ORDER — MICONAZOLE NITRATE 2 %
1 CREAM WITH APPLICATOR VAGINAL AT BEDTIME
Qty: 35 G | Refills: 0 | Status: SHIPPED | OUTPATIENT
Start: 2024-12-16 | End: 2024-12-16

## 2024-12-16 RX ORDER — METRONIDAZOLE 500 MG/1
500 TABLET ORAL 2 TIMES DAILY
Qty: 14 TABLET | Refills: 0 | Status: SHIPPED | OUTPATIENT
Start: 2024-12-16 | End: 2024-12-16

## 2024-12-16 RX ORDER — METRONIDAZOLE 500 MG/1
500 TABLET ORAL 2 TIMES DAILY
Qty: 14 TABLET | Refills: 0 | Status: SHIPPED | OUTPATIENT
Start: 2024-12-16

## 2024-12-16 NOTE — TELEPHONE ENCOUNTER
[facility] Call Center    Phone Message    May a detailed message be left on voicemail: yes     Reason for Call: Patient is calling she would like to change her pharmacy to the [facility] [address] [facility]. For her presripton from the lab test she had done today. Please call her back to discuss. Thank you.     Action Taken: Other: obgyn    Travel Screening: Not Applicable     Date of Service:                                                                           Patient is calling she would like to change her pharmacy to the [facility] [address] [facility]. For her presripton from the lab test she had done today. Please call her back to discuss. Thank you.

## 2024-12-16 NOTE — TELEPHONE ENCOUNTER
[facility] Call Center    Phone Message    May a detailed message be left on voicemail: yes     Reason for Call: Other: pt received results and has heard nothing back about treatment - please advise     Action Taken: Message routed to:  Other: rd ob    Travel Screening: Not Applicable     Date of Service:

## 2024-12-17 ENCOUNTER — TELEPHONE (OUTPATIENT)
Dept: MIDWIFE SERVICES | Facility: CLINIC | Age: 34
End: 2024-12-17
Payer: COMMERCIAL

## 2024-12-17 NOTE — TELEPHONE ENCOUNTER
37w0d     Patient calling with questions about her BV and yeast prescriptions. It is the same treatment as earlier this month so she is wanting to make sure its the right medication to treat.       Should patient take flagyl and miconazole at the same time or one treatment before the other?  Last time she was directed to take the yeast cream about 3 days into the antibiotics. Want to make sure that is still  the recommendation    Lena LIANG RN   [facility] Triage RN

## 2024-12-17 NOTE — PROGRESS NOTES
36w6d  Sujatha is seen for an acute visit for ongoing vaginal discharge and pelvic pressure. Reports good fetal movement. Denies leaking of fluid, vaginal bleeding, regular uterine contractions, headache, visual changes, or other concerns. She was recently treated for bacterial vaginosis. Symptoms of vaginal odor have resolved. She denies burning or itching. Wet prep is positive for clue cells and yeast. Plan Flagyl 500mg BID x 7d and miconazole 2% vaginal suppository x 7d. Plan to [facility] in 2 days for routine prenatal visit with BSUS, CBC, GBS.

## 2024-12-17 NOTE — TELEPHONE ENCOUNTER
Patient called again later in the evening to switch her rx to [facility].    Lena LIANG RN   [facility] Triage RN

## 2024-12-17 NOTE — TELEPHONE ENCOUNTER
Triage Call:    Patient calling to request prescriptions be transferred to [facility] outpatient pharmacy. Midwife Lindsay Bravo prescribed Flagyl and miconazole cream today, sent to inpatient pharmacy. Discussed Flagyl warning with Midwife, Lindsay, reports less Flagyl than prescribed to infants. Patient reports that she is not breastfeeding.    Transferred prescription to requested pharmacy per approval from Midwife.    Heather Mcbride RN  12/16/24 8:30 PM  [facility] Nurse Advisor        Reason for Disposition   [1] Prescription prescribed recently is not at pharmacy AND [2] triager has access to patient's EMR AND [3] prescription is recorded in the EMR    Additional Information   Negative: [1] Intentional drug overdose AND [2] suicidal thoughts or ideas   Negative: Drug overdose and triager unable to answer question   Negative: Caller requesting a renewal or refill of a medicine patient is currently taking   Negative: Caller requesting information unrelated to medicine   Negative: Caller requesting information about COVID-19 Vaccine   Negative: Caller requesting information about Emergency Contraception   Negative: Caller requesting information about Combined Birth Control Pills   Negative: Caller requesting information about Progestin Birth Control Pills   Negative: Caller requesting information about Post-Op pain or medicines   Negative: Caller requesting a prescription antibiotic (such as Penicillin) for Strep throat and has a positive culture result   Negative: Caller requesting a prescription anti-viral med (such as Tamiflu) and has influenza (flu) symptoms   Negative: Immunization reaction suspected   Negative: Rash while taking a medicine or within 3 days of stopping it   Negative: [1] Asthma and [2] having symptoms of asthma (cough, wheezing, etc.)   Negative: [1] Symptom of illness (e.g., headache, abdominal pain, earache, vomiting) AND [2] more than mild   Negative: Breastfeeding  questions about mother's medicines and diet   Negative: MORE THAN A DOUBLE DOSE of a prescription or over-the-counter (OTC) drug   Negative: [1] DOUBLE DOSE (an extra dose or lesser amount) of prescription drug AND [2] any symptoms (e.g., dizziness, nausea, pain, sleepiness)   Negative: [1] DOUBLE DOSE (an extra dose or lesser amount) of over-the-counter (OTC) drug AND [2] any symptoms (e.g., dizziness, nausea, pain, sleepiness)   Negative: Took another person's prescription drug   Negative: [1] DOUBLE DOSE (an extra dose or lesser amount) of prescription drug AND [2] NO symptoms  (Exception: A double dose of antibiotics.)   Negative: Diabetes drug error or overdose (e.g., took wrong type of insulin or took extra dose)   Negative: [1] Prescription not at pharmacy AND [2] was prescribed by PCP recently (Exception: Triager has access to EMR and prescription is recorded there. Go to Home Care and confirm for pharmacy.)   Negative: [1] Pharmacy calling with prescription question AND [2] triager unable to answer question   Negative: [1] Caller has URGENT medicine question about med that PCP or specialist prescribed AND [2] triager unable to answer question   Negative: Medicine patch causing local rash or itching   Negative: [1] Caller has medicine question about med NOT prescribed by PCP AND [2] triager unable to answer question (e.g., compatibility with other med, storage)   Negative: Prescription request for new medicine (not a refill)   Negative: [1] Caller has NON-URGENT medicine question about med that PCP prescribed AND [2] triager unable to answer question   Negative: Caller wants to use a complementary or alternative medicine    Protocols used: Medication Question Call-A-

## 2024-12-17 NOTE — TELEPHONE ENCOUNTER
It is fine to start the yeast treatment a few days after the metronidazole.  Shaquille Davis CNM

## 2024-12-18 ENCOUNTER — PRENATAL OFFICE VISIT (OUTPATIENT)
Dept: MIDWIFE SERVICES | Facility: CLINIC | Age: 34
End: 2024-12-18
Payer: COMMERCIAL

## 2024-12-18 VITALS
OXYGEN SATURATION: 97 % | SYSTOLIC BLOOD PRESSURE: 126 MMHG | DIASTOLIC BLOOD PRESSURE: 79 MMHG | WEIGHT: 193.5 LBS | BODY MASS INDEX: 31.23 KG/M2 | HEART RATE: 91 BPM

## 2024-12-18 DIAGNOSIS — D69.6 THROMBOCYTOPENIA (H): ICD-10-CM

## 2024-12-18 DIAGNOSIS — Z23 NEED FOR PROPHYLACTIC VACCINATION AND INOCULATION AGAINST INFLUENZA: ICD-10-CM

## 2024-12-18 DIAGNOSIS — B96.89 BV (BACTERIAL VAGINOSIS): ICD-10-CM

## 2024-12-18 DIAGNOSIS — N76.0 BV (BACTERIAL VAGINOSIS): ICD-10-CM

## 2024-12-18 DIAGNOSIS — Z34.83 ENCOUNTER FOR SUPERVISION OF OTHER NORMAL PREGNANCY, THIRD TRIMESTER: Primary | ICD-10-CM

## 2024-12-18 LAB
ERYTHROCYTE [DISTWIDTH] IN BLOOD BY AUTOMATED COUNT: 12.9 % (ref 10–15)
HCT VFR BLD AUTO: 37.1 % (ref 35–47)
HGB BLD-MCNC: 12.7 G/DL (ref 11.7–15.7)
MCH RBC QN AUTO: 32.2 PG (ref 26.5–33)
MCHC RBC AUTO-ENTMCNC: 34.2 G/DL (ref 31.5–36.5)
MCV RBC AUTO: 94 FL (ref 78–100)
PLATELET # BLD AUTO: 104 10E3/UL (ref 150–450)
RBC # BLD AUTO: 3.94 10E6/UL (ref 3.8–5.2)
WBC # BLD AUTO: 9.6 10E3/UL (ref 4–11)

## 2024-12-18 PROCEDURE — 85027 COMPLETE CBC AUTOMATED: CPT | Performed by: ADVANCED PRACTICE MIDWIFE

## 2024-12-18 PROCEDURE — 36415 COLL VENOUS BLD VENIPUNCTURE: CPT | Performed by: ADVANCED PRACTICE MIDWIFE

## 2024-12-18 RX ORDER — METRONIDAZOLE 500 MG/1
500 TABLET ORAL 2 TIMES DAILY
Qty: 14 TABLET | Refills: 0 | Status: SHIPPED | OUTPATIENT
Start: 2024-12-18 | End: 2024-12-25

## 2024-12-18 NOTE — PROGRESS NOTES
37w1d  Feeling frustrated with response of team for bacterial vaginosis treatment. Really wanted to retreat symptoms but was put off multiple times and it took her a week to get treated. Rx for flagyl and future wet prep orders placed so that she can be free to test and treat in future. Also recommended that she call the [facility] number and request to speak directly to CNM. Reports good fetal movement. Denies leaking of fluid, vaginal bleeding, regular uterine contractions, headache or other concerns.  RTC in 1 wk [facility]

## 2024-12-18 NOTE — RESULT ENCOUNTER NOTE
Dear Sujatha,    Your test results are attached below. Your platelets are stable.  If you have any questions, please contact me via [employer] or you can call our office at [phone].    Sharon Huizar CNM, [facility]ARDO[facility], [facility]

## 2024-12-22 ENCOUNTER — TELEPHONE (OUTPATIENT)
Dept: OBGYN | Facility: CLINIC | Age: 34
End: 2024-12-22
Payer: COMMERCIAL

## 2024-12-22 ENCOUNTER — HOSPITAL ENCOUNTER (OUTPATIENT)
Facility: CLINIC | Age: 34
Discharge: HOME | End: 2024-12-22
Attending: OBSTETRICS & GYNECOLOGY | Admitting: ADVANCED PRACTICE MIDWIFE
Payer: COMMERCIAL

## 2024-12-22 ENCOUNTER — HOSPITAL ENCOUNTER (OUTPATIENT)
Facility: CLINIC | Age: 34
End: 2024-12-22
Attending: ADVANCED PRACTICE MIDWIFE | Admitting: ADVANCED PRACTICE MIDWIFE
Payer: COMMERCIAL

## 2024-12-22 ENCOUNTER — NURSE TRIAGE (OUTPATIENT)
Dept: NURSING | Facility: CLINIC | Age: 34
End: 2024-12-22
Payer: COMMERCIAL

## 2024-12-22 VITALS
BODY MASS INDEX: 30.37 KG/M2 | RESPIRATION RATE: 18 BRPM | TEMPERATURE: 97.5 F | HEIGHT: 66 IN | SYSTOLIC BLOOD PRESSURE: 108 MMHG | WEIGHT: 189 LBS | DIASTOLIC BLOOD PRESSURE: 56 MMHG

## 2024-12-22 DIAGNOSIS — O21.9 NAUSEA AND VOMITING IN PREGNANCY: Primary | ICD-10-CM

## 2024-12-22 PROCEDURE — 250N000011 HC RX IP 250 OP 636: Performed by: ADVANCED PRACTICE MIDWIFE

## 2024-12-22 PROCEDURE — 96361 HYDRATE IV INFUSION ADD-ON: CPT

## 2024-12-22 PROCEDURE — 96360 HYDRATION IV INFUSION INIT: CPT

## 2024-12-22 PROCEDURE — 59025 FETAL NON-STRESS TEST: CPT

## 2024-12-22 PROCEDURE — G0463 HOSPITAL OUTPT CLINIC VISIT: HCPCS | Mod: 25

## 2024-12-22 PROCEDURE — 96374 THER/PROPH/DIAG INJ IV PUSH: CPT

## 2024-12-22 RX ORDER — LIDOCAINE 40 MG/G
CREAM TOPICAL
Status: DISCONTINUED | OUTPATIENT
Start: 2024-12-22 | End: 2024-12-22 | Stop reason: HOSPADM

## 2024-12-22 RX ORDER — ONDANSETRON 2 MG/ML
4 INJECTION INTRAMUSCULAR; INTRAVENOUS EVERY 6 HOURS PRN
Status: DISCONTINUED | OUTPATIENT
Start: 2024-12-22 | End: 2024-12-22 | Stop reason: HOSPADM

## 2024-12-22 RX ORDER — CALCIUM CARBONATE 500 MG/1
1 TABLET, CHEWABLE ORAL 2 TIMES DAILY
COMMUNITY

## 2024-12-22 RX ORDER — DEXTROSE, SODIUM CHLORIDE, SODIUM LACTATE, POTASSIUM CHLORIDE, AND CALCIUM CHLORIDE 5; .6; .31; .03; .02 G/100ML; G/100ML; G/100ML; G/100ML; G/100ML
INJECTION, SOLUTION INTRAVENOUS
Status: COMPLETED
Start: 2024-12-22 | End: 2024-12-22

## 2024-12-22 RX ORDER — DEXTROSE, SODIUM CHLORIDE, SODIUM LACTATE, POTASSIUM CHLORIDE, AND CALCIUM CHLORIDE 5; .6; .31; .03; .02 G/100ML; G/100ML; G/100ML; G/100ML; G/100ML
INJECTION, SOLUTION INTRAVENOUS CONTINUOUS
Status: DISCONTINUED | OUTPATIENT
Start: 2024-12-22 | End: 2024-12-22 | Stop reason: HOSPADM

## 2024-12-22 RX ORDER — ONDANSETRON 4 MG/1
4 TABLET, FILM COATED ORAL EVERY 6 HOURS PRN
Qty: 12 TABLET | Refills: 0 | Status: ON HOLD | OUTPATIENT
Start: 2024-12-22 | End: 2024-12-22

## 2024-12-22 RX ADMIN — ONDANSETRON 4 MG: 2 INJECTION INTRAMUSCULAR; INTRAVENOUS at 15:00

## 2024-12-22 RX ADMIN — DEXTROSE, SODIUM CHLORIDE, SODIUM LACTATE, POTASSIUM CHLORIDE, AND CALCIUM CHLORIDE: 5; .6; .31; .03; .02 INJECTION, SOLUTION INTRAVENOUS at 14:55

## 2024-12-22 RX ADMIN — SODIUM CHLORIDE, SODIUM LACTATE, POTASSIUM CHLORIDE, CALCIUM CHLORIDE AND DEXTROSE MONOHYDRATE: 5; 600; 310; 30; 20 INJECTION, SOLUTION INTRAVENOUS at 14:55

## 2024-12-22 ASSESSMENT — ACTIVITIES OF DAILY LIVING (ADL)
ADLS_ACUITY_SCORE: 20
ADLS_ACUITY_SCORE: 42
ADLS_ACUITY_SCORE: 20

## 2024-12-22 NOTE — H&P
HOSPITAL TRIAGE NOTE  ===================    CHIEF COMPLAINT  ========================  Sujatha Vasquez is a 34 year old patient presenting today at 37w5d for evaluation of nausea, vomiting and diarrhea starting this morning around 0630.    Patient's last menstrual period was 2024.  Estimated Date of Delivery: 2025     HPI  ==================   Talked to patient around 0900 this morning. See note for details. Plan was made to monitor for a few hours and if no improvement or if worsening, she would come to [facility] for IV fluids and zofran to maintain hydration. She is currently staying in the hospital with her daughter who is here for treatment for leukemia. She presented to [facility] shortly after 1400. Reports no emesis since around noon, no diarrhea since this morning.   CONTRACTIONS: not felt by patient  ABDOMINAL PAIN: none  FETAL MOVEMENT: active  VAGINAL BLEEDING: none  RUPTURE OF MEMBRANES: no    REVIEW OF SYSTEMS  =====================  C: NEGATIVE for fever, chills  I: NEGATIVE for worrisome rashes, moles or lesions  E: NEGATIVE for vision changes or irritation  R: NEGATIVE for significant cough or SOB  CV: NEGATIVE for chest pain, palpitations or varicosities  GI: POSITIVE for diarrhea, nausea, and vomiting  : NEGATIVE for frequency, dysuria, or hematuria  M: NEGATIVE for significant arthralgias or myalgia  N: NEGATIVE for headache, weakness, dizziness or paresthesias  P: NEGATIVE for changes in mood or affect    HISTORIES  ==============  ALLERGIES:      Allergies   Allergen Reactions    Seasonale     Levonorgestrel-Ethinyl Estrad Itching and Rash    Nickel Rash and Unknown     PAST MEDICAL HISTORY  Past Medical History:   Diagnosis Date    Thrombocytopenia (H)      PARTNER:  is with daughter (also starting to feel symptomatic)  FAMILY HISTORY  Family History   Problem Relation Age of Onset    Breast Cancer Mother     Atrial fibrillation Father     Breast Cancer Maternal  "Grandmother     No Known Problems Maternal Grandfather     No Known Problems Paternal Grandmother     No Known Problems Paternal Grandfather     No Known Problems Brother     No Known Problems Sister     No Known Problems Sister     Colon Cancer Paternal Uncle      OB HISTORY  OB History    Para Term  AB Living   2 1 0 1 0 1   SAB IAB Ectopic Multiple Live Births   0 0 0 0 1      # Outcome Date GA Lbr Medhat/2nd Weight Sex Type Anes PTL Lv   2 Current            1  23 35w5d 20:30 / 00:15 3.31 kg (7 lb 4.8 oz) F Vag-Spont EPI Y BAILEY      Name: MEGHAN,FEMALE-CALISTA      Apgar1: 8  Apgar5: 9       EXAM  ============  /56 (BP Location: Left arm, Patient Position: Right side, Cuff Size: Adult Regular)   Temp 97.5  F (36.4  C) (Oral)   Resp 18   Ht 1.676 m (5' 6\")   Wt 85.7 kg (189 lb)   LMP 2024   BMI 30.51 kg/m    Patient Vitals for the past 24 hrs:   BP Temp Temp src Resp Height Weight   24 1512 -- -- -- -- 1.676 m (5' 6\") 85.7 kg (189 lb)   24 1431 108/56 97.5  F (36.4  C) Oral 18 -- --     GENERAL APPEARANCE: ill, lying on side in bed in dark room, eyes closed  RESP: lungs clear to auscultation - no rales, rhonchi or wheezes  CV: regular rates and rhythm, normal S1 S2, no S3 or S4 and no murmur,and no varicosities  ABDOMEN:  soft, nontender,non-tender between contractions no epigastric pain  NEURO: Denies headache, blurred vision    CONTRACTIONS:  EVERY 2-5 MINUTES  mild  FETAL HEART TONES:  160 with moderate variability, accelerations-yes, decels none.  NST: REACTIVE    PELVIC EXAM:deferred  ROM: No    DIAGNOSIS  ============  37w5d  Nausea and vomiting in third trimester  NST: REACTIVE  Fetal Heart rate tracing: category one    PLAN  ============  Discussed likely GI virus given both she and her  are now symptomatic and although they have eaten in the same facilities they have not had the same thing to eat (with the exception of shared chicken wings at " dinner last night and she only had one).   Reviewed comfort measures and goal to remain as hydrated as possible.   Patient already feeling improvement with liter of D5LR and Zofran. She has a prescription for oral Zofran at [facility].   Anticipate discharge after IV fluid complete.  Keep next appointments as scheduled and call or return to [facility] with worsening symptoms, vaginal bleeding, leaking of fluid, decreased fetal movement or contractions.   Medically Ready for Discharge: Anticipated Today  SAPNA Kirkpatrick CNM

## 2024-12-22 NOTE — PROVIDER NOTIFICATION
12/22/24 1431   Fetal Assessment   Fetal HR Assessment Method external US   Fetal HR (beats/min) 170   Fetal HR Baseline other (see comments)     EFM applied. FHR initially 170, after 50 seconds on the monitor, FHR down to 160's. Appeared to be in an acceleration when EFM applied

## 2024-12-22 NOTE — PLAN OF CARE
Goal Outcome Evaluation:         1L of IV fluid infused, zofran given. No vomiting or diarrhea at this time, just nausea. Patient stating that she if feeling better. VSS, see flowsheet for FHR/ cx details. AVS reviewed. Return precautions emphasized. Patient states understanding of when to seek follow up care.

## 2024-12-22 NOTE — TELEPHONE ENCOUNTER
Patient is a  @ 37w5d who called with c/o nausea and vomiting since 0630 this morning. No contractions, LOF or bleeding. Baby is active. Did not sleep well last night - was feeling very anxious. Starting feeling off around 4am, first emesis at 0630. Nobody else sick with similar symptoms. She did eat lobster mac and cheese at a restaurant last night, nobody else ate it. She is currently here at [facility] with her daughter who is being treated for leukemia. We discussed comfort measures, trying to keep hydrated with frequent small sips. Encouraged to try and get some Gatorade or Pedialyte for some electrolytes. If unable to keep anything down for 24 hours she should come in for evaluation, possible IV fluids. Additionally, encouraged to call back if contractions start, water breaks, with any vaginal bleeding or decreased fetal movement. Gave personal pager number for direct contact with any questions or concerns. Discussed Zofran for nausea may or may not help and it will not stop vomiting but may improve nausea. Rx sent to [facility] per patient request. Keep next appointment as scheduled, sooner prn. Aiyana Naylor CNM

## 2024-12-22 NOTE — DISCHARGE SUMMARY
"  S: Patient improving, has not vomited since noon. Nausea has improved, although she does still have waves of nausea. Was able to  her Zofran from pharmacy. Requesting cervical exam to make sure the contractions she is having are not changing her cervix.     O:  Blood pressure 108/56, temperature 97.5  F (36.4  C), temperature source Oral, resp. rate 18, height 1.676 m (5' 6\"), weight 85.7 kg (189 lb), last menstrual period 04/02/2024, unknown if currently breastfeeding.  General appearance: comfortable    CONTRACTIONS: rare  Palpate: mild  FETAL HEART TONES: baseline 150 with moderate FHR variability and    accelerations yes. Decelerations no.    NST: REACTIVE  PELVIC EXAM:1cm/long/high    ASSESSMENT:  ==============  IUP @ 37w5d Nausea and vomiting in the third trimester   Fetal Heart rate tracing category one    PLAN:  ===========  Discussed comfort measures, advancing diet as tolerated.   Has PO Zofran to take - can take next dose at 2000.   Will keep next prenatal visit as scheduled.   Has my personal pager number to call with any questions or concerns overnight.   SAPNA Kirkpatrick, CAS   "

## 2024-12-22 NOTE — PROVIDER NOTIFICATION
12/22/24 1425   Provider Notification   Provider Name/Title Aiyana Dayday   Method of Notification Electronic Page   Request Evaluate in Person   Notification Reason Patient Arrived     37.5 weeks arrived reporting N/V & Diarrhea today. Some cramping. Denies LOF other than some vaginal discharge r/t BV and vaginal cream being used for Yeast. Denies any VB.

## 2024-12-22 NOTE — TELEPHONE ENCOUNTER
"OB Triage Call    Is patient's OB/Midwife with the [facility] or [facility]? [facility]- Proceed with triage     Reason for call: Nausea, vomiting, diarrhea    Assessment: Vomited a few times today. She is also having diarrhea. She has vomited 3 times so far today. She has had 3-4 diarrheal stools today as well. Symptoms started around 6:30am. She states she does not feel like she has a fever - unable to take her temp. Pt is on Flagyl  and miconazole for BV - she thinks she missed a dosage. She has been urinating.     Plan: Per protocol Home care advise given. Patient wanted to speak with the on call [facility] - Aiyana Cunha. FNA paged Aiyana at 8:48am - CNM called back around 8:50am and she will give the patient a call.     Patient plans to deliver at [facility]    Patient's primary OB Provider is [facility].        37w5d    Estimated Date of Delivery: 2025        OB History    Para Term  AB Living   2 1 0 1 0 1   SAB IAB Ectopic Multiple Live Births   0 0 0 0 1      # Outcome Date GA Lbr Medhat/2nd Weight Sex Type Anes PTL Lv   2 Current            1  / 35w5d 20:30 / 00:15 3.31 kg (7 lb 4.8 oz) F Vag-Spont EPI Y BAILEY      Name: MEGHANFEMALE-CALISTA      Apgar1: 8  Apgar5: 9       No results found for: \"GBS\"       Calista Yin RN     Reason for Disposition   MILD or MODERATE vomiting (e.g., 1 - 5 times / day)    Additional Information   Negative: Shock suspected (e.g., cold/pale/clammy skin, too weak to stand, low BP, rapid pulse)   Negative: Difficult to awaken or acting confused (e.g., disoriented, slurred speech)   Negative: Sounds like a life-threatening emergency to the triager   Negative: Vomiting occurs only while coughing   Negative: [1] Pregnant < 20 Weeks AND [2] nausea/vomiting began in early pregnancy (i.e., 4-8 weeks pregnant)   Negative: Chest pain   Negative: Headache is main symptom   Negative: Vomiting (or Nausea) in a cancer patient who " "is currently (or recently) receiving chemotherapy or radiation therapy, or cancer patient who has metastatic or end-stage cancer and is receiving palliative care   Negative: [1] Vomiting AND [2] contains red blood or black (\"coffee ground\") material  (Exception: Few red streaks in vomit that only happened once.)   Negative: Severe pain in one eye   Negative: Recent head injury (within last 3 days)   Negative: Recent abdominal injury (within last 3 days)   Negative: [1] Insulin-dependent diabetes (Type I) AND [2] glucose > 400 mg/dl (22 mmol/l)   Negative: [1] Vomiting AND [2] hernia is more painful or swollen than usual   Negative: [1] SEVERE vomiting (e.g., 6 or more times/day) AND [2] present > 8 hours (Exception: Patient sounds well, is drinking liquids, does not sound dehydrated, and vomiting has lasted less than 24 hours.)   Negative: [1] MODERATE vomiting (e.g., 3 - 5 times/day) AND [2] age > 60 years   Negative: Severe headache (e.g., excruciating)  (Exception: Similar to previous migraines.)   Negative: High-risk adult (e.g., diabetes mellitus, brain tumor, V-P shunt, hernia)   Negative: [1] Drinking very little AND [2] dehydration suspected (e.g., no urine > 12 hours, very dry mouth, very lightheaded)   Negative: Patient sounds very sick or weak to the triager   Negative: [1] Vomiting AND [2] abdomen looks much more swollen than usual   Negative: [1] Vomiting AND [2] contains bile (green color)   Negative: [1] Constant abdominal pain AND [2] present > 2 hours   Negative: [1] Fever > 101 F (38.3 C) AND [2] age > 60 years   Negative: [1] Fever > 100.0 F (37.8 C) AND [2] bedridden (e.g., CVA, chronic illness, recovering from surgery)   Negative: [1] Fever > 100.0 F (37.8 C) AND [2] weak immune system (e.g., HIV positive, cancer chemo, splenectomy, organ transplant, chronic steroids)   Negative: Taking any of the following medications: digoxin (Lanoxin), lithium, theophylline, phenytoin (Dilantin)   Negative: " [1] MILD or MODERATE vomiting AND [2] present > 48 hours (2 days) (Exception: Mild vomiting with associated diarrhea.)   Negative: Fever present > 3 days (72 hours)   Negative: Vomiting a prescription medication   Negative: [1] MILD vomiting with diarrhea AND [2] present > 5 days   Negative: Substance use (drug use) or unhealthy alcohol use, known or suspected   Negative: Vomiting is a chronic symptom (recurrent or ongoing AND present > 4 weeks)   Negative: [1] SEVERE vomiting (e.g., 6 or more times/day, vomits everything) BUT [2] hydrated   Negative: [1] Fever > 103 F (39.4 C) AND [2] not able to get the fever down using Fever Care Advice    Protocols used: Vomiting-A-AH

## 2024-12-23 ENCOUNTER — PRENATAL OFFICE VISIT (OUTPATIENT)
Dept: MIDWIFE SERVICES | Facility: CLINIC | Age: 34
End: 2024-12-23
Payer: COMMERCIAL

## 2024-12-23 ENCOUNTER — HOSPITAL ENCOUNTER (OUTPATIENT)
Facility: CLINIC | Age: 34
Discharge: HOME | End: 2024-12-23
Attending: ADVANCED PRACTICE MIDWIFE | Admitting: ADVANCED PRACTICE MIDWIFE
Payer: COMMERCIAL

## 2024-12-23 VITALS
WEIGHT: 191 LBS | SYSTOLIC BLOOD PRESSURE: 104 MMHG | HEART RATE: 92 BPM | BODY MASS INDEX: 30.83 KG/M2 | DIASTOLIC BLOOD PRESSURE: 70 MMHG

## 2024-12-23 VITALS — TEMPERATURE: 98.5 F | DIASTOLIC BLOOD PRESSURE: 55 MMHG | SYSTOLIC BLOOD PRESSURE: 100 MMHG

## 2024-12-23 DIAGNOSIS — Z34.83 ENCOUNTER FOR SUPERVISION OF OTHER NORMAL PREGNANCY, THIRD TRIMESTER: Primary | ICD-10-CM

## 2024-12-23 DIAGNOSIS — D69.6 THROMBOCYTOPENIA (H): ICD-10-CM

## 2024-12-23 LAB
ERYTHROCYTE [DISTWIDTH] IN BLOOD BY AUTOMATED COUNT: 13.2 % (ref 10–15)
HCT VFR BLD AUTO: 36.4 % (ref 35–47)
HGB BLD-MCNC: 12.7 G/DL (ref 11.7–15.7)
MCH RBC QN AUTO: 31.9 PG (ref 26.5–33)
MCHC RBC AUTO-ENTMCNC: 34.9 G/DL (ref 31.5–36.5)
MCV RBC AUTO: 92 FL (ref 78–100)
PLATELET # BLD AUTO: 100 10E3/UL (ref 150–450)
RBC # BLD AUTO: 3.98 10E6/UL (ref 3.8–5.2)
WBC # BLD AUTO: 7.2 10E3/UL (ref 4–11)

## 2024-12-23 PROCEDURE — 250N000011 HC RX IP 250 OP 636: Performed by: ADVANCED PRACTICE MIDWIFE

## 2024-12-23 PROCEDURE — 96360 HYDRATION IV INFUSION INIT: CPT

## 2024-12-23 PROCEDURE — 85027 COMPLETE CBC AUTOMATED: CPT | Performed by: ADVANCED PRACTICE MIDWIFE

## 2024-12-23 PROCEDURE — 99207 PR PRENATAL VISIT: CPT | Performed by: ADVANCED PRACTICE MIDWIFE

## 2024-12-23 PROCEDURE — 59025 FETAL NON-STRESS TEST: CPT

## 2024-12-23 PROCEDURE — 99212 OFFICE O/P EST SF 10 MIN: CPT | Performed by: ADVANCED PRACTICE MIDWIFE

## 2024-12-23 PROCEDURE — 36415 COLL VENOUS BLD VENIPUNCTURE: CPT | Performed by: ADVANCED PRACTICE MIDWIFE

## 2024-12-23 RX ORDER — PROCHLORPERAZINE MALEATE 10 MG
10 TABLET ORAL EVERY 6 HOURS PRN
Status: DISCONTINUED | OUTPATIENT
Start: 2024-12-23 | End: 2024-12-23 | Stop reason: HOSPADM

## 2024-12-23 RX ORDER — ONDANSETRON 4 MG/1
4 TABLET, ORALLY DISINTEGRATING ORAL EVERY 6 HOURS PRN
Status: DISCONTINUED | OUTPATIENT
Start: 2024-12-23 | End: 2024-12-23 | Stop reason: HOSPADM

## 2024-12-23 RX ORDER — DEXTROSE, SODIUM CHLORIDE, SODIUM LACTATE, POTASSIUM CHLORIDE, AND CALCIUM CHLORIDE 5; .6; .31; .03; .02 G/100ML; G/100ML; G/100ML; G/100ML; G/100ML
INJECTION, SOLUTION INTRAVENOUS CONTINUOUS
Status: DISCONTINUED | OUTPATIENT
Start: 2024-12-23 | End: 2024-12-23 | Stop reason: HOSPADM

## 2024-12-23 RX ORDER — LIDOCAINE 40 MG/G
CREAM TOPICAL
Status: DISCONTINUED | OUTPATIENT
Start: 2024-12-23 | End: 2024-12-23 | Stop reason: HOSPADM

## 2024-12-23 RX ORDER — METOCLOPRAMIDE HYDROCHLORIDE 5 MG/ML
10 INJECTION INTRAMUSCULAR; INTRAVENOUS EVERY 6 HOURS PRN
Status: DISCONTINUED | OUTPATIENT
Start: 2024-12-23 | End: 2024-12-23 | Stop reason: HOSPADM

## 2024-12-23 RX ORDER — ONDANSETRON 2 MG/ML
4 INJECTION INTRAMUSCULAR; INTRAVENOUS EVERY 6 HOURS PRN
Status: DISCONTINUED | OUTPATIENT
Start: 2024-12-23 | End: 2024-12-23 | Stop reason: HOSPADM

## 2024-12-23 RX ORDER — METOCLOPRAMIDE 10 MG/1
10 TABLET ORAL EVERY 6 HOURS PRN
Status: DISCONTINUED | OUTPATIENT
Start: 2024-12-23 | End: 2024-12-23 | Stop reason: HOSPADM

## 2024-12-23 RX ADMIN — SODIUM CHLORIDE, SODIUM LACTATE, POTASSIUM CHLORIDE, CALCIUM CHLORIDE AND DEXTROSE MONOHYDRATE: 5; 600; 310; 30; 20 INJECTION, SOLUTION INTRAVENOUS at 01:23

## 2024-12-23 ASSESSMENT — ACTIVITIES OF DAILY LIVING (ADL)
ADLS_ACUITY_SCORE: 20
ADLS_ACUITY_SCORE: 20

## 2024-12-23 NOTE — DISCHARGE INSTRUCTIONS
Learning About When to Call Your Doctor During Pregnancy (After 20 Weeks)  Overview  It's common to have concerns about what might be a problem when you're pregnant. Most pregnancies don't have any serious problems. But it's still important to know when to call your doctor if you have certain symptoms or signs of labor.  These are general suggestions. Your doctor may give you some more information about when to call.  When to call your doctor (after 20 weeks)  Call 911  anytime you think you may need emergency care. For example, call if:  You have severe vaginal bleeding. This means you are soaking through a pad each hour for 2 or more hours.  You have sudden, severe pain in your belly.  You have chest pain, are short of breath, or cough up blood.  You passed out (lost consciousness).  You have a seizure.  You see or feel the umbilical cord.  You think you are about to deliver your baby and can't make it safely to the hospital or birthing center.  Call your doctor now or seek immediate medical care if:  You have vaginal bleeding.  You have belly pain.  You have a fever.  You are dizzy or lightheaded, or you feel like you may faint.  You have signs of a blood clot in your leg (called a deep vein thrombosis), such as:  Pain in the calf, back of the knee, thigh, or groin.  Swelling in your leg or groin.  A color change on the leg or groin. The skin may be reddish or purplish, depending on your usual skin color.  You have symptoms of preeclampsia, such as:  Sudden swelling of your face, hands, or feet.  New vision problems (such as dimness, blurring, or seeing spots).  A severe headache.  You have a sudden release of fluid from your vagina. (You think your water broke.)  You've been having regular contractions for an hour. This means that you've had at least 6 contractions within 1 hour, even after you change your position and drink fluids.  You notice that your baby has stopped moving or is moving less than  "normal.  You have signs of heart failure, such as:  New or increased shortness of breath.  New or worse swelling in your legs, ankles, or feet.  Sudden weight gain, such as more than 2 to 3 pounds in a day or 5 pounds in a week.  Feeling so tired or weak that you cannot do your usual activities.  You have symptoms of a urinary tract infection. These may include:  Pain or burning when you urinate.  A frequent need to urinate without being able to pass much urine.  Pain in the flank, which is just below the rib cage and above the waist on either side of the back.  Blood in your urine.  Watch closely for changes in your health, and be sure to contact your doctor if:  You have vaginal discharge that smells bad.  You feel sad, anxious, or hopeless for more than a few days.  You have skin changes, such as a rash, itching, or a yellow color to your skin.  You have other concerns about your pregnancy.  If you have labor signs at 37 weeks or more  If you have signs of labor at 37 weeks or more, your doctor may tell you to call when your labor becomes more active. Symptoms of active labor include:  Contractions that are regular.  Contractions that are less than 5 minutes apart.  Contractions that are hard to talk through.  Follow-up care is a key part of your treatment and safety. Be sure to make and go to all appointments, and call your doctor if you are having problems. It's also a good idea to know your test results and keep a list of the medicines you take.  Where can you learn more?  Go to https://www.[employer].net/patiented  Enter N531 in the search box to learn more about \"Learning About When to Call Your Doctor During Pregnancy (After 20 Weeks).\"  Current as of: April 30, 2024  Content Version: 14.3    2024 [facility] [employer].   Care instructions adapted under license by your healthcare professional. If you have questions about a medical condition or this instruction, always ask your healthcare professional. " [employer], [facility] disclaims any warranty or liability for your use of this information.

## 2024-12-23 NOTE — PROGRESS NOTES
37w6d  Sujatha is feeling much better this morning after receiving the second bag of IV fluids in triage last night. She is not ashkan as much, and is feeling better hydrated. Discussed GBS in urine, even in early pregnancy, will mean recommendation of antibiotics in labor. CBC with plts drawn for thrombocytopenia. Reviewed her birth plan, nothing outside of our standard practice. Discussed AMTS, given history of retained placenta and low plts, she is fine with AMTS. Return to care in 1 week.  Shaquille Davis CNM

## 2024-12-23 NOTE — PLAN OF CARE
Data: Patient assessed in the [facility] for nausea and vomiting. Cervical exam deferred. Membranes intact. Contractions are not present. See flowsheets for fetal assessment documentation.     Action: Presumed adequate fetal oxygenation documented. Discharge instructions reviewed. Patient instructed to report change in fetal movement, vaginal leaking of fluid or bleeding, abdominal pain, or any concerns related to the pregnancy to provider/clinic.      Response: Orders to discharge home per Aiyana Naylor CNM. Patient verbalized understanding of education and agreement with plan. Discharged at 0250, patient wheeled back to daughters room on Peds Unit 4.

## 2024-12-23 NOTE — PLAN OF CARE
Sujatha presented to L&D at 0000 for another evaluation related to viral GI illness with continued uterine cramping and dehydration. She was here previously yesterday afternoon and [facility] decided [facility]. She has not had vomiting since noon, but has had continued nausea and diarrhea. Has been able to keep down small sips of fluid. EFM applied. Aiyana Naylor CNM notified of patient arrival. Patient declines rechecking cervix, would just like to try fluid bolus at this time. Plan to start IV and give 1 LR D5LR and additional antiemetics if desired.

## 2024-12-23 NOTE — H&P
HOSPITAL TRIAGE NOTE  ===================    CHIEF COMPLAINT  ========================  Sujatha Vasquez is a 34 year old patient presenting today at 37w6d for evaluation of nausea and vomiting.    Patient's last menstrual period was 2024.  Estimated Date of Delivery: 2025       HPI  ==================   She was seen earlier today and felt better after one liter of D5LR. She was discharged with oral Zofran. She took one at 2100. Came back to [facility] because she was feeling more frequent contractions and wondered if it is related to dehydration because she still hasn't been able to drink a significant amount of fluid. Feels contractions have spaced out since getting on unit, declines cervical exam at this time.   CONTRACTIONS: mild  ABDOMINAL PAIN: cramping  FETAL MOVEMENT: active  VAGINAL BLEEDING: none  RUPTURE OF MEMBRANES: no  PELVIC PAIN: none    REVIEW OF SYSTEMS  =====================  C: NEGATIVE for fever, chills  I: NEGATIVE for worrisome rashes, moles or lesions  E: NEGATIVE for vision changes or irritation  R: NEGATIVE for significant cough or SOB  CV: NEGATIVE for chest pain, palpitations or varicosities  GI: POSITIVE for diarrhea, nausea, and vomiting  : NEGATIVE for frequency, dysuria, or hematuria  M: NEGATIVE for significant arthralgias or myalgia  N: NEGATIVE for headache, weakness, dizziness or paresthesias  P: NEGATIVE for changes in mood or affect    HISTORIES  ==============  ALLERGIES:      Allergies   Allergen Reactions    Seasonale     Levonorgestrel-Ethinyl Estrad Itching and Rash    Nickel Rash and Unknown     PAST MEDICAL HISTORY  Past Medical History:   Diagnosis Date    Thrombocytopenia (H)      PARTNER: Here at hospital with daughter  FAMILY HISTORY  Family History   Problem Relation Age of Onset    Breast Cancer Mother     Atrial fibrillation Father     Breast Cancer Maternal Grandmother     No Known Problems Maternal Grandfather     No Known Problems Paternal  Grandmother     No Known Problems Paternal Grandfather     No Known Problems Brother     No Known Problems Sister     No Known Problems Sister     Colon Cancer Paternal Uncle      OB HISTORY  OB History    Para Term  AB Living   2 1 0 1 0 1   SAB IAB Ectopic Multiple Live Births   0 0 0 0 1      # Outcome Date GA Lbr Medhat/2nd Weight Sex Type Anes PTL Lv   2 Current            1  23 35w5d 20:30 / 00:15 3.31 kg (7 lb 4.8 oz) F Vag-Spont EPI Y BAILEY      Name: MEGHAN,FEMALE-CALISTA      Apgar1: 8  Apgar5: 9       EXAM  ============  BP 95/51 (BP Location: Left arm, Patient Position: Right side, Cuff Size: Adult Regular)   Temp 98.6  F (37  C) (Oral)   LMP 2024   Patient Vitals for the past 24 hrs:   BP Temp Temp src   24 0013 95/51 98.6  F (37  C) Oral     GENERAL APPEARANCE: healthy, alert and no distress  RESP: lungs clear to auscultation - no rales, rhonchi or wheezes  CV: regular rates and rhythm, normal S1 S2, no S3 or S4 and no murmur,and no varicosities  ABDOMEN:  soft, nontender,non-tender between contractions no epigastric pain  NEURO: Denies headache, blurred vision  PSYCH: mentation appears normal. and affect normal/bright  LEGS: Reflexes normal bilaterally    CONTRACTIONS:  EVERY 3-4 MINUTES  mild  FETAL HEART TONES:  145 with moderate variability, accelerations-yes, decels none.  NST: REACTIVE  EFW:8lbs    PELVIC EXAM: deferred  ROM: No    DIAGNOSIS  ============  37w6d  Nausea and vomiting in third trimester  NST: REACTIVE  Fetal Heart rate tracing: category one    PLAN  ============  Patient states she just wants to make sure contractions are not related to dehydration and potentially causing her to go into labor but declines cervical exam at this time.   Would like another bolus of fluid as she has not been able to drink a significant amount of water today, although she still hasn't vomited since noon she has continued to have diarrhea.  D5LR bolus  Offer Reglan or  Compazine if desired  Reevalute once bolus complete  Medically Ready for Discharge: Anticipated Today  SAPNA KirkpatrickM

## 2024-12-23 NOTE — DISCHARGE SUMMARY
S:Ria reports feeling better after liter of fluid. She is ready to be discharged and hopeful that she will be able to get some sleep and then be able to eat/drink more in the morning.     O:  Blood pressure 100/55, temperature 98.5  F (36.9  C), temperature source Oral, last menstrual period 04/02/2024, unknown if currently breastfeeding.  General appearance: comfortable, tired    CONTRACTIONS: none  Palpate: none  FETAL HEART TONES: baseline 135 with moderate FHR variability and    accelerations yes. Decelerations no.    NST: REACTIVE  ROM: not ruptured  PELVIC EXAM:deferred    ASSESSMENT:  ==============  IUP @ 37w6d nausea and vomiting in third trimester   Fetal Heart rate tracing category one     PLAN:  ===========  Discharge now   Patient planning to take next dose of oral Zofran at 0300  Keep next appointment as scheduled  Encouraged to call with any questions or concerns  SAPNA Kirkpatrick, CAS

## 2024-12-24 ENCOUNTER — MYC MEDICAL ADVICE (OUTPATIENT)
Dept: MIDWIFE SERVICES | Facility: CLINIC | Age: 34
End: 2024-12-24
Payer: COMMERCIAL

## 2024-12-26 ENCOUNTER — TELEPHONE (OUTPATIENT)
Dept: OBGYN | Facility: CLINIC | Age: 34
End: 2024-12-26
Payer: COMMERCIAL

## 2024-12-26 NOTE — TELEPHONE ENCOUNTER
Pts daughter was discharged from [place] from cancer care.  Pt no desires to deliver at [place] due to the distance.  Pt would like to transfer to the [facility] clinic for delivery at the [facility].

## 2024-12-26 NOTE — TELEPHONE ENCOUNTER
Pt has seen [facility] for prenatal care, currently 38w2d, , desires to transfer care to  and [facility] at  [facility] due to being closer to home (pt had prenatal care at [facility] as daughter was admitted at [facility] for care but is now discharged).    Pt last seen 2024- pt aware we do not have midwives with our [facility]  group that deliver at [facility].    RN routing to providers to advise on work in appt for ERNESTO.    Priyanka Duff RN on 2024 at 4:03 PM

## 2024-12-27 ENCOUNTER — ANCILLARY PROCEDURE (OUTPATIENT)
Dept: ULTRASOUND IMAGING | Facility: CLINIC | Age: 34
End: 2024-12-27
Attending: ADVANCED PRACTICE MIDWIFE
Payer: COMMERCIAL

## 2024-12-27 DIAGNOSIS — Z34.83 ENCOUNTER FOR SUPERVISION OF OTHER NORMAL PREGNANCY IN THIRD TRIMESTER: ICD-10-CM

## 2024-12-27 PROCEDURE — 76816 OB US FOLLOW-UP PER FETUS: CPT

## 2024-12-30 ENCOUNTER — LAB REQUISITION (OUTPATIENT)
Dept: LAB | Facility: CLINIC | Age: 34
End: 2024-12-30
Payer: COMMERCIAL

## 2024-12-30 DIAGNOSIS — R74.01 ELEVATION OF LEVELS OF LIVER TRANSAMINASE LEVELS: ICD-10-CM

## 2024-12-30 DIAGNOSIS — D69.6 THROMBOCYTOPENIA, UNSPECIFIED: ICD-10-CM

## 2024-12-30 LAB
ALBUMIN MFR UR ELPH: <6 MG/DL
CREAT UR-MCNC: 21.8 MG/DL
ERYTHROCYTE [DISTWIDTH] IN BLOOD BY AUTOMATED COUNT: 13.3 % (ref 10–15)
HCT VFR BLD AUTO: 39 % (ref 35–47)
HGB BLD-MCNC: 13.2 G/DL (ref 11.7–15.7)
MCH RBC QN AUTO: 31.4 PG (ref 26.5–33)
MCHC RBC AUTO-ENTMCNC: 33.8 G/DL (ref 31.5–36.5)
MCV RBC AUTO: 93 FL (ref 78–100)
PLATELET # BLD AUTO: 111 10E3/UL (ref 150–450)
PROT/CREAT 24H UR: NORMAL MG/G{CREAT}
RBC # BLD AUTO: 4.2 10E6/UL (ref 3.8–5.2)
WBC # BLD AUTO: 8.1 10E3/UL (ref 4–11)

## 2024-12-30 PROCEDURE — 85027 COMPLETE CBC AUTOMATED: CPT | Mod: ORL | Performed by: PHYSICIAN ASSISTANT

## 2024-12-30 PROCEDURE — 84450 TRANSFERASE (AST) (SGOT): CPT | Mod: ORL | Performed by: PHYSICIAN ASSISTANT

## 2024-12-30 PROCEDURE — 82565 ASSAY OF CREATININE: CPT | Mod: ORL | Performed by: PHYSICIAN ASSISTANT

## 2024-12-30 PROCEDURE — 84156 ASSAY OF PROTEIN URINE: CPT | Mod: ORL | Performed by: PHYSICIAN ASSISTANT

## 2024-12-30 PROCEDURE — 84460 ALANINE AMINO (ALT) (SGPT): CPT | Mod: ORL | Performed by: PHYSICIAN ASSISTANT

## 2024-12-30 NOTE — TELEPHONE ENCOUNTER
Does patient still need an appointment?  I see she has appointments scheduled with someone else.      Reny Conde, DO

## 2024-12-30 NOTE — TELEPHONE ENCOUNTER
She has prenatal visits scheduled with the [facility] that she has been seeing this pregnancy but is wanting to switch her care to  and delivery at [facility] now due to being closer to home (pt had prenatal care at [facility] as daughter was admitted at [facility] for care but is now discharged).     Routing back to provider pool to advise on [facility]NEST[facility] work in as pt does not wish to continue her care at [facility] due to location.    Natalie Blackman, RN- OB/GYN group

## 2024-12-31 LAB
ALT SERPL W P-5'-P-CCNC: 44 U/L (ref 0–50)
AST SERPL W P-5'-P-CCNC: 28 U/L (ref 0–45)
CREAT SERPL-MCNC: 0.53 MG/DL (ref 0.51–0.95)
EGFRCR SERPLBLD CKD-EPI 2021: >90 ML/MIN/1.73M2

## 2025-01-02 NOTE — TELEPHONE ENCOUNTER
Unable to reach patient via phone. RN left a message and instructed patient to call the clinic at [phone].    Priyanka Duff RN on 1/2/2025 at 4:33 PM

## 2025-01-02 NOTE — TELEPHONE ENCOUNTER
Unfortunately I have no availability and when I review other's schedule it does not look like we have availability in the next week. She is already 39w2d so it would be difficult to transfer at this stage.